# Patient Record
Sex: FEMALE | Race: WHITE | NOT HISPANIC OR LATINO | Employment: OTHER | URBAN - METROPOLITAN AREA
[De-identification: names, ages, dates, MRNs, and addresses within clinical notes are randomized per-mention and may not be internally consistent; named-entity substitution may affect disease eponyms.]

---

## 2021-08-24 ENCOUNTER — HISTORICAL (OUTPATIENT)
Dept: RADIOLOGY | Facility: HOSPITAL | Age: 86
End: 2021-08-24

## 2021-09-20 ENCOUNTER — HISTORICAL (OUTPATIENT)
Dept: RESPIRATORY THERAPY | Facility: HOSPITAL | Age: 86
End: 2021-09-20

## 2021-11-19 ENCOUNTER — HOSPITAL ENCOUNTER (OUTPATIENT)
Dept: MEDSURG UNIT | Facility: HOSPITAL | Age: 86
End: 2021-11-21
Attending: INTERNAL MEDICINE | Admitting: INTERNAL MEDICINE

## 2021-11-19 LAB
FLUAV AG UPPER RESP QL IA.RAPID: NEGATIVE
FLUBV AG UPPER RESP QL IA.RAPID: NEGATIVE
SARS-COV-2 RNA RESP QL NAA+PROBE: NEGATIVE

## 2021-11-20 LAB
ABS NEUT (OLG): 6.6 X10(3)/MCL (ref 1.5–6.9)
ALBUMIN SERPL-MCNC: 3.6 GM/DL (ref 3.4–4.8)
ALBUMIN/GLOB SERPL: 1 RATIO (ref 1.1–2)
ALP SERPL-CCNC: 63 UNIT/L (ref 40–150)
ALT SERPL-CCNC: 16 UNIT/L (ref 0–55)
APPEARANCE, UA: ABNORMAL
APTT PPP: 25 SEC (ref 23.4–34.9)
AST SERPL-CCNC: 22 UNIT/L (ref 5–34)
BACTERIA SPEC CULT: ABNORMAL /HPF
BASOPHILS # BLD AUTO: 0 X10(3)/MCL (ref 0–0.1)
BASOPHILS NFR BLD AUTO: 1 % (ref 0–1)
BILIRUB SERPL-MCNC: 0.7 MG/DL
BILIRUB UR QL STRIP: NEGATIVE
BILIRUBIN DIRECT+TOT PNL SERPL-MCNC: 0.3 MG/DL (ref 0–0.5)
BILIRUBIN DIRECT+TOT PNL SERPL-MCNC: 0.4 MG/DL (ref 0–0.8)
BNP BLD-MCNC: 82 PG/ML
BUN SERPL-MCNC: 11 MG/DL (ref 9.8–20.1)
CALCIUM SERPL-MCNC: 9.8 MG/DL (ref 8.7–10.5)
CHLORIDE SERPL-SCNC: 102 MMOL/L (ref 98–107)
CK SERPL-CCNC: 105 U/L (ref 29–168)
CO2 SERPL-SCNC: 24 MMOL/L (ref 23–31)
COLOR UR: YELLOW
CREAT SERPL-MCNC: 0.72 MG/DL (ref 0.55–1.02)
EOSINOPHIL # BLD AUTO: 0.1 X10(3)/MCL (ref 0–0.6)
EOSINOPHIL NFR BLD AUTO: 1 % (ref 0–5)
ERYTHROCYTE [DISTWIDTH] IN BLOOD BY AUTOMATED COUNT: 12.1 % (ref 11.5–17)
GLOBULIN SER-MCNC: 3.5 GM/DL (ref 2.4–3.5)
GLUCOSE (UA): NEGATIVE MG/DL
GLUCOSE SERPL-MCNC: 116 MG/DL (ref 82–115)
HCT VFR BLD AUTO: 34.7 % (ref 36–48)
HGB BLD-MCNC: 11.7 GM/DL (ref 12–16)
HGB UR QL STRIP: ABNORMAL
IMM GRANULOCYTES # BLD AUTO: 0.04 10*3/UL (ref 0–0.02)
IMM GRANULOCYTES NFR BLD AUTO: 0.4 % (ref 0–0.43)
INR PPP: 1 (ref 2–3)
KETONES UR QL STRIP: NEGATIVE MG/DL
LACTATE SERPL-SCNC: 0.9 MMOL/L (ref 0.5–2.2)
LEUKOCYTE ESTERASE UR QL STRIP: ABNORMAL
LYMPHOCYTES # BLD AUTO: 1.4 X10(3)/MCL (ref 0.5–4.1)
LYMPHOCYTES NFR BLD AUTO: 16 % (ref 15–40)
MCH RBC QN AUTO: 31 PG (ref 27–34)
MCHC RBC AUTO-ENTMCNC: 34 GM/DL (ref 31–36)
MCV RBC AUTO: 91 FL (ref 80–99)
MONOCYTES # BLD AUTO: 0.8 X10(3)/MCL (ref 0–1.1)
MONOCYTES NFR BLD AUTO: 9 % (ref 4–12)
NEUTROPHILS # BLD AUTO: 6.6 X10(3)/MCL (ref 1.5–6.9)
NEUTROPHILS NFR BLD AUTO: 73 % (ref 43–75)
NITRITE UR QL STRIP: POSITIVE
PH UR STRIP: 6 [PH] (ref 4.6–8)
PLATELET # BLD AUTO: 213 X10(3)/MCL (ref 140–400)
PMV BLD AUTO: 9.7 FL (ref 6.8–10)
POTASSIUM SERPL-SCNC: 4.1 MMOL/L (ref 3.5–5.1)
PROT SERPL-MCNC: 7.1 GM/DL (ref 5.8–7.6)
PROT UR QL STRIP: NEGATIVE MG/DL
PROTHROMBIN TIME: 13 SEC (ref 11.7–14.5)
RBC # BLD AUTO: 3.81 X10(6)/MCL (ref 4.2–5.4)
RBC #/AREA URNS HPF: ABNORMAL /HPF
SODIUM SERPL-SCNC: 134 MMOL/L (ref 136–145)
SP GR UR STRIP: 1.01 (ref 1–1.03)
SQUAMOUS EPITHELIAL, UA: ABNORMAL /LPF
TROPONIN I SERPL-MCNC: 0.02 NG/ML (ref 0–0.04)
UROBILINOGEN UR STRIP-ACNC: 0.2 EU/DL
WBC # SPEC AUTO: 9 X10(3)/MCL (ref 4.5–11.5)
WBC #/AREA URNS HPF: ABNORMAL /HPF

## 2021-11-21 LAB
ABS NEUT (OLG): 8.9 X10(3)/MCL (ref 1.5–6.9)
BASOPHILS # BLD AUTO: 0 X10(3)/MCL (ref 0–0.1)
BASOPHILS NFR BLD AUTO: 0 % (ref 0–1)
BUN SERPL-MCNC: 13 MG/DL (ref 9.8–20.1)
CALCIUM SERPL-MCNC: 9.5 MG/DL (ref 8.7–10.5)
CHLORIDE SERPL-SCNC: 101 MMOL/L (ref 98–107)
CO2 SERPL-SCNC: 24 MMOL/L (ref 23–31)
CREAT SERPL-MCNC: 0.74 MG/DL (ref 0.55–1.02)
CREAT/UREA NIT SERPL: 18
EOSINOPHIL # BLD AUTO: 0 X10(3)/MCL (ref 0–0.6)
EOSINOPHIL NFR BLD AUTO: 0 % (ref 0–5)
ERYTHROCYTE [DISTWIDTH] IN BLOOD BY AUTOMATED COUNT: 12.3 % (ref 11.5–17)
GLUCOSE SERPL-MCNC: 100 MG/DL (ref 82–115)
HCT VFR BLD AUTO: 34 % (ref 36–48)
HGB BLD-MCNC: 11.4 GM/DL (ref 12–16)
IMM GRANULOCYTES # BLD AUTO: 0.06 10*3/UL (ref 0–0.02)
IMM GRANULOCYTES NFR BLD AUTO: 0.5 % (ref 0–0.43)
LYMPHOCYTES # BLD AUTO: 1.6 X10(3)/MCL (ref 0.5–4.1)
LYMPHOCYTES NFR BLD AUTO: 14 % (ref 15–40)
MAGNESIUM SERPL-MCNC: 2.1 MG/DL (ref 1.6–2.6)
MCH RBC QN AUTO: 31 PG (ref 27–34)
MCHC RBC AUTO-ENTMCNC: 34 GM/DL (ref 31–36)
MCV RBC AUTO: 92 FL (ref 80–99)
MONOCYTES # BLD AUTO: 1 X10(3)/MCL (ref 0–1.1)
MONOCYTES NFR BLD AUTO: 8 % (ref 4–12)
NEUTROPHILS # BLD AUTO: 8.9 X10(3)/MCL (ref 1.5–6.9)
NEUTROPHILS NFR BLD AUTO: 77 % (ref 43–75)
PLATELET # BLD AUTO: 211 X10(3)/MCL (ref 140–400)
PMV BLD AUTO: 9.5 FL (ref 6.8–10)
POTASSIUM SERPL-SCNC: 4.2 MMOL/L (ref 3.5–5.1)
RBC # BLD AUTO: 3.7 X10(6)/MCL (ref 4.2–5.4)
SODIUM SERPL-SCNC: 136 MMOL/L (ref 136–145)
WBC # SPEC AUTO: 11.6 X10(3)/MCL (ref 4.5–11.5)

## 2022-04-10 ENCOUNTER — HISTORICAL (OUTPATIENT)
Dept: ADMINISTRATIVE | Facility: HOSPITAL | Age: 87
End: 2022-04-10

## 2022-04-24 VITALS
HEIGHT: 61 IN | OXYGEN SATURATION: 98 % | BODY MASS INDEX: 21.9 KG/M2 | WEIGHT: 116 LBS | DIASTOLIC BLOOD PRESSURE: 68 MMHG | SYSTOLIC BLOOD PRESSURE: 124 MMHG

## 2022-05-04 NOTE — HISTORICAL [facility]
This is a historical note converted from [employer]omer. Formatting and pictures may have been removed.  Please reference [employer]omer for original formatting and attached multimedia. 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  Immunizations  No immunizations recorded for this visit.  Hospital Course  85yo female admitted for hypoxia from a COPD/asthma exacerbation.? She was also found to have a UTI as well.? She was started on steroids and nebs.? Today she is doing better and has been weaned off supplemental O2.? She is now stable for discharge home today.  Time Spent on discharge  D/C=34mins  Objective  Vitals & Measurements  T:?36.9? ?C (Oral)? TMIN:?36.6? ?C (Oral)? TMAX:?37.4? ?C (Oral)? HR:?90(Peripheral)? RR:?18? BP:?165/78? SpO2:?92%?  Physical Exam  General: ?Alert and oriented, No acute distress. ?  ??????? ? Appearance: frail. ?  ??????? ? Behavior: Appropriate. ?  ??????? ? Skin: Normal for ethnicity. ?  ?????Eye: ?Pupils are equal, round and reactive to light, Extraocular movements are intact. ?  ?????HENT: ?Normocephalic, Normal hearing, Oral mucosa is moist, No pharyngeal erythema. ?  ?????Neck: ?Supple, Non-tender, No carotid bruit, No jugular venous distention, No lymphadenopathy, No thyromegaly. ?  ?????Respiratory: ?Lungs are clear to auscultation, Breath sounds are equal, No chest wall tenderness. ?  ?????Cardiovascular: ?Normal rate, Regular rhythm, No murmur, No gallop, Good pulses equal in all extremities, Normal peripheral perfusion, No edema. ?  ?????Gastrointestinal: ?Soft, Non-tender, Non-distended, Normal bowel sounds, No  organomegaly. ?  ?????Genitourinary: ?No costovertebral angle tenderness, No urethral discharge. ?  ?????Lymphatics: ?No lymphadenopathy neck, axilla, groin. ?  ?????Musculoskeletal: ?Normal range of motion, Normal strength, No tenderness, No swelling, Normal gait. ?  ?????Integumentary: ?Warm, Dry, Pink, Intact, No rash. ?  ?????Neurologic: ?Alert, Oriented, Normal sensory, Normal motor function, No focal deficits, Cranial Nerves II-XII are grossly intact. ?  ?????Psychiatric: ?Cooperative, Appropriate mood & affect, Normal judgment. ?  Patient Discharge Condition  stable  Discharge Disposition  home   Discharge Medication Reconciliation  Prescribed  albuterol (Ventolin HFA 90 mcg/inh inhalation aerosol)?1 puff(s), INH, q4hr, PRN as needed for wheezing  doxycycline (doxycycline hyclate 100 mg oral capsule)?100 mg, Oral, BID  predniSONE (prednisONE 20 mg oral tablet)?20 mg, Oral, Daily  Continue  albuterol (albuterol 0.083% inhalation solution)?2.5 mg, INH, q6hr, PRN for wheezing  albuterol-ipratropium (DuoNeb 0.5 mg-2.5 mg/3 mL inhalation solution)?3 mL, NEB, QID  benzonatate (Tessalon Perles 100 mg oral capsule)?200 mg, Oral, TID, PRN as needed for cough  bisoprolol (bisoprolol 5 mg oral tablet)?5 mg, Oral, Daily  cetirizine (cetirizine 10 mg oral tablet)?10 mg, Oral, Daily  fluticasone nasal (fluticasone 50 mcg/inh nasal spray)?1 spray(s), Daily  fluticasone-vilanterol (Breo Ellipta 100 mcg-25 mcg/inh inhalation powder)?1 puff(s), INH, Daily  levothyroxine (Synthroid 50 mcg (0.05 mg) oral tablet)?50 mcg, Oral, Daily  losartan (losartan 50 mg oral tablet)?50 mg, Oral, Daily  lovastatin (lovastatin 40 mg oral tablet)?40 mg, Oral, Daily  Education and Orders Provided  Chronic Obstructive Pulmonary Disease, Easy-to-Read  Discharge - 11/21/21 12:24:00 CST, Home?  Discharge Diet - Cardiac?  Discharge Activity - Activity as Tolerated?  Follow up  pcp in 1-2 weeks  Car Se[employer]  No Qualifying Data

## 2022-05-04 NOTE — HISTORICAL [facility]
This is a historical note converted from [employer]omer. Formatting and pictures may have been removed.  Please reference [employer]omer for original formatting and attached multimedia. Chief Complaint  Cough continuously throught the day; denies any other complaints, took flu shot yesterday; has hx of copd; Albuterol Inhaler used 1 hour PTA;  History of Present Illness  86 year old F patient with PMH of COPD, HTN, HLD, GERD and hypothyroidism presents to the ER with SOB, dry cough and wheezing for one day. No exacerbating or relieving factors. Denies fever, chills, chest pain, orthopnea or PND. In the ER, CXR was unremarkable. She felt better after neb treatment but could not be weaned off the 2L of O2 that she was requiring. She was admitted for further management.  Review of Systems  Except as documented, all other systems are negative.?  Physical Exam  Vitals & Measurements  T:?37.7? ?C (Oral)? HR:?77(Monitored)? RR:?20? BP:?137/75? SpO2:?92%? WT:?58?kg?  CONSTITUTIONAL: vitals as noted, alert, awake, no distress  HEENT: No scleral icterus, oropharynx clear  RESPIRATORY: clear to auscultation  CVS: regular rate and rhythm  GASTROINTESTINAL: soft, non-tender, non-distended, bs +  NEURO: grossly normal exam, moves all extremities  HEM/LYMPH: no lymphadenopathy  PSYCH: oriented to time, place and person  SKIN: no rashes noted  Assessment/Plan  ?  COPD exacerbation  - start on duonebs q6 and prednisone  - oxygen protocol  ?  Asymptomatic bacteriuria  - not symptomatic  - hold off on antibiotics  ?  HTN  - home meds  ?  HLD  - statin  ?  Hypothyroidism  - synthroid  ?  Full code and  is surrogate decision maker  ?  The patient is expected to have a LOS < 2 midnights and will be admitted to the observation status  ?  Service was provided using HIPAA compliant web platform using SOC for audio/visual equipment.  Patient Location: Hospital  Provider Location: Home ([place], [place])  Participants on call: bedside RN, patient  Consent  was obtained, and the patient was seen with nurse assisting from the bedside.  ?  ?   Problem List/Past Medical History  Ongoing  COPD with asthma  GERD - Gastro-esophageal reflux disease  Hearing loss  High Cholesterol(  Confirmed  )  Hyperlipidemia  Hypothyroidism  Sinus Trouble(  Confirmed  )  Thyroid Disease(  Confirmed  )  Historical  No qualifying data  Procedure/Surgical History  Cholecystectomy (2002)  Appendectomy (1971)  Hysterectomy (1971)  Tonsillectomy (1954)   Medications  Home  albuterol 0.083% inhalation solution, 2.5 mg= 3 mL, INH, q6hr, PRN, 11 refills  bisoprolol 5 mg oral tablet, 5 mg= 1 tab(s), Oral, Daily  Breo Ellipta 100 mcg-25 mcg/inh inhalation powder, 1 puff(s), INH, Daily, 11 refills  cetirizine 10 mg oral tablet, 10 mg= 1 tab(s), Oral, Daily  DuoNeb 0.5 mg-2.5 mg/3 mL inhalation solution, 3 mL, NEB, QID,? ?Not taking  fluticasone 50 mcg/inh nasal spray, 1 spray(s), Daily  losartan 50 mg oral tablet, 50 mg= 1 tab(s), Oral, Daily  lovastatin 40 mg oral tablet, 40 mg= 1 tab(s), Oral, Daily  Synthroid 50 mcg (0.05 mg) oral tablet, 50 mcg= 1 tab(s), Oral, Daily  Tessalon Perles 100 mg oral capsule, 200 mg= 2 cap(s), Oral, TID, PRN,? ?Not taking  Allergies  No Known Medication Allergies  Social History  Abuse/Neglect  No, 11/19/2021  No, 11/08/2021  No, 10/19/2021  Tobacco  Never (less than 100 in lifetime), N/A, 11/19/2021  Never (less than 100 in lifetime), No, 11/08/2021  Never (less than 100 in lifetime), No, 10/19/2021  Family History  Coronary artery disease: Father.  Immunizations  Vaccine Date Status Comments   COVID-19 mRNA, LNP-S, PF - Moderna 03/02/2021 Recorded Elayne   COVID-19 mRNA, LNP-S, PF - Moderna 02/02/2021 Recorded Elayne   Lab Results  CBC, BMP unremarkable  Diagnostic Results  CXR - no infiltrate

## 2022-06-30 ENCOUNTER — HOSPITAL ENCOUNTER (OUTPATIENT)
Dept: RADIOLOGY | Facility: HOSPITAL | Age: 87
Discharge: HOME | End: 2022-06-30
Payer: MEDICARE

## 2022-06-30 DIAGNOSIS — R05.3 PERSISTENT COUGH FOR 3 WEEKS OR LONGER: ICD-10-CM

## 2022-06-30 DIAGNOSIS — J44.9 CHRONIC OBSTRUCTIVE PULMONARY DISEASE, UNSPECIFIED COPD TYPE: ICD-10-CM

## 2022-06-30 PROCEDURE — 71046 X-RAY EXAM CHEST 2 VIEWS: CPT | Mod: TC

## 2022-08-12 ENCOUNTER — HOSPITAL ENCOUNTER (OUTPATIENT)
Dept: RADIOLOGY | Facility: HOSPITAL | Age: 87
Discharge: HOME | End: 2022-08-12
Attending: INTERNAL MEDICINE
Payer: MEDICARE

## 2022-08-12 DIAGNOSIS — J44.9 COPD (CHRONIC OBSTRUCTIVE PULMONARY DISEASE): ICD-10-CM

## 2022-08-12 PROCEDURE — 71046 X-RAY EXAM CHEST 2 VIEWS: CPT | Mod: TC

## 2022-08-24 DIAGNOSIS — Z78.0 POSTMENOPAUSAL: Primary | ICD-10-CM

## 2022-08-24 DIAGNOSIS — Z12.31 ENCOUNTER FOR SCREENING MAMMOGRAM FOR MALIGNANT NEOPLASM OF BREAST: ICD-10-CM

## 2022-08-30 ENCOUNTER — HOSPITAL ENCOUNTER (OUTPATIENT)
Dept: RADIOLOGY | Facility: HOSPITAL | Age: 87
Discharge: HOME | End: 2022-08-30
Attending: INTERNAL MEDICINE
Payer: MEDICARE

## 2022-08-30 DIAGNOSIS — Z12.31 ENCOUNTER FOR SCREENING MAMMOGRAM FOR MALIGNANT NEOPLASM OF BREAST: ICD-10-CM

## 2022-08-30 DIAGNOSIS — Z78.0 POSTMENOPAUSAL: ICD-10-CM

## 2022-08-30 PROCEDURE — 77080 DXA BONE DENSITY AXIAL: CPT | Mod: TC

## 2022-08-30 PROCEDURE — 77067 SCR MAMMO BI INCL CAD: CPT | Mod: TC

## 2022-08-30 PROCEDURE — 77063 BREAST TOMOSYNTHESIS BI: CPT | Mod: 26,,, | Performed by: STUDENT IN AN ORGANIZED HEALTH CARE EDUCATION/TRAINING PROGRAM

## 2022-08-30 PROCEDURE — 77067 SCR MAMMO BI INCL CAD: CPT | Mod: 26,,, | Performed by: STUDENT IN AN ORGANIZED HEALTH CARE EDUCATION/TRAINING PROGRAM

## 2022-08-30 PROCEDURE — 77067 MAMMO DIGITAL SCREENING BILAT WITH TOMO: ICD-10-PCS | Mod: 26,,, | Performed by: STUDENT IN AN ORGANIZED HEALTH CARE EDUCATION/TRAINING PROGRAM

## 2022-08-30 PROCEDURE — 77063 MAMMO DIGITAL SCREENING BILAT WITH TOMO: ICD-10-PCS | Mod: 26,,, | Performed by: STUDENT IN AN ORGANIZED HEALTH CARE EDUCATION/TRAINING PROGRAM

## 2022-09-19 ENCOUNTER — HOSPITAL ENCOUNTER (EMERGENCY)
Facility: HOSPITAL | Age: 87
Discharge: HOME | End: 2022-09-19
Attending: GENERAL ACUTE CARE HOSPITAL
Payer: MEDICARE

## 2022-09-19 VITALS
BODY MASS INDEX: 23.22 KG/M2 | TEMPERATURE: 98 F | OXYGEN SATURATION: 96 % | RESPIRATION RATE: 18 BRPM | SYSTOLIC BLOOD PRESSURE: 176 MMHG | DIASTOLIC BLOOD PRESSURE: 99 MMHG | HEIGHT: 61 IN | WEIGHT: 123 LBS | HEART RATE: 76 BPM

## 2022-09-19 DIAGNOSIS — R31.9 URINARY TRACT INFECTION WITH HEMATURIA, SITE UNSPECIFIED: ICD-10-CM

## 2022-09-19 DIAGNOSIS — N39.0 URINARY TRACT INFECTION WITH HEMATURIA, SITE UNSPECIFIED: ICD-10-CM

## 2022-09-19 DIAGNOSIS — S39.012A STRAIN OF LUMBAR REGION, INITIAL ENCOUNTER: Primary | ICD-10-CM

## 2022-09-19 LAB
APPEARANCE UR: CLEAR
BACTERIA #/AREA URNS AUTO: ABNORMAL /HPF
BILIRUB UR QL STRIP.AUTO: NEGATIVE MG/DL
COLOR UR AUTO: YELLOW
GLUCOSE UR QL STRIP.AUTO: NEGATIVE MG/DL
KETONES UR QL STRIP.AUTO: NEGATIVE MG/DL
LEUKOCYTE ESTERASE UR QL STRIP.AUTO: ABNORMAL UNIT/L
NITRITE UR QL STRIP.AUTO: POSITIVE
PH UR STRIP.AUTO: 6 [PH]
PROT UR QL STRIP.AUTO: NEGATIVE MG/DL
RBC #/AREA URNS AUTO: ABNORMAL /HPF
RBC UR QL AUTO: ABNORMAL UNIT/L
SP GR UR STRIP.AUTO: 1.01
SQUAMOUS #/AREA URNS AUTO: ABNORMAL /HPF
UROBILINOGEN UR STRIP-ACNC: 0.2 MG/DL
WBC #/AREA URNS AUTO: ABNORMAL /HPF

## 2022-09-19 PROCEDURE — 99284 EMERGENCY DEPT VISIT MOD MDM: CPT | Mod: 25

## 2022-09-19 PROCEDURE — 25000003 PHARM REV CODE 250: Performed by: NURSE PRACTITIONER

## 2022-09-19 PROCEDURE — 81001 URINALYSIS AUTO W/SCOPE: CPT | Performed by: NURSE PRACTITIONER

## 2022-09-19 RX ORDER — DICLOFENAC SODIUM 75 MG/1
75 TABLET, DELAYED RELEASE ORAL 2 TIMES DAILY
COMMUNITY

## 2022-09-19 RX ORDER — METHOCARBAMOL 500 MG/1
500 TABLET, FILM COATED ORAL
Status: COMPLETED | OUTPATIENT
Start: 2022-09-19 | End: 2022-09-19

## 2022-09-19 RX ORDER — ALENDRONATE SODIUM 70 MG/1
70 TABLET ORAL
COMMUNITY
Start: 2022-09-16

## 2022-09-19 RX ORDER — CEPHALEXIN 500 MG/1
500 CAPSULE ORAL 4 TIMES DAILY
Qty: 20 CAPSULE | Refills: 0 | Status: SHIPPED | OUTPATIENT
Start: 2022-09-19 | End: 2022-09-24

## 2022-09-19 RX ORDER — ACETAMINOPHEN 325 MG/1
650 TABLET ORAL EVERY 6 HOURS PRN
COMMUNITY

## 2022-09-19 RX ORDER — GABAPENTIN 100 MG/1
100 CAPSULE ORAL 3 TIMES DAILY
COMMUNITY
Start: 2022-07-29

## 2022-09-19 RX ORDER — CHOLECALCIFEROL (VITAMIN D3) 25 MCG
2000 TABLET ORAL
COMMUNITY

## 2022-09-19 RX ORDER — CEPHALEXIN 250 MG/1
500 CAPSULE ORAL
Status: COMPLETED | OUTPATIENT
Start: 2022-09-19 | End: 2022-09-19

## 2022-09-19 RX ORDER — METHOCARBAMOL 750 MG/1
750 TABLET, FILM COATED ORAL 3 TIMES DAILY
Qty: 15 TABLET | Refills: 0 | Status: SHIPPED | OUTPATIENT
Start: 2022-09-19 | End: 2022-09-24

## 2022-09-19 RX ORDER — FLUTICASONE FUROATE AND VILANTEROL 100; 25 UG/1; UG/1
1 POWDER RESPIRATORY (INHALATION)
COMMUNITY
End: 2023-10-17 | Stop reason: ALTCHOICE

## 2022-09-19 RX ADMIN — CEPHALEXIN 500 MG: 250 CAPSULE ORAL at 07:09

## 2022-09-19 RX ADMIN — METHOCARBAMOL 500 MG: 500 TABLET ORAL at 07:09

## 2022-09-19 NOTE — ED PROVIDER NOTES
"Encounter Date: 9/19/2022       History     Chief Complaint   Patient presents with    Back Pain     PATIENT had T7 fx with repair x1 month ago, also complaining of right shoulder pain with no injury, and also complaining of a "bladder issue" denies dysuria. Daughter states that the patient typically gets UTIs after vaccines.     Patient is 86-year-old female history of Alzheimer's that presents emerged home with her 2 family members with complaints of low back pain and what she thinks to be a bladder infection.  Patient states the symptoms of her ladder started a week ago and that Sunday she was doing some lifting and hurt her back.  The patient is in no distress at this time and is very hard of hearing so her family members at the bedside did have to assist with history.  This is the extent of her complaints in the emergency department today.    Review of patient's allergies indicates:   Allergen Reactions    Ciprofloxacin Itching     Past Medical History:   Diagnosis Date    COPD (chronic obstructive pulmonary disease)     Thyroid disease      Past Surgical History:   Procedure Laterality Date    APPENDECTOMY      CHOLECYSTECTOMY      HYSTERECTOMY      TONSILLECTOMY       No family history on file.  Social History     Tobacco Use    Smoking status: Never    Smokeless tobacco: Never     Review of Systems   Constitutional:  Negative for fever.   HENT:  Negative for sore throat.    Respiratory:  Negative for shortness of breath.    Cardiovascular:  Negative for chest pain.   Gastrointestinal:  Negative for nausea.   Genitourinary:  Positive for dysuria.   Musculoskeletal:  Positive for back pain.   Skin:  Negative for rash.   Neurological:  Negative for dizziness and weakness.   Hematological:  Does not bruise/bleed easily.     Physical Exam     Initial Vitals [09/19/22 1709]   BP Pulse Resp Temp SpO2   (!) 167/81 82 18 98 °F (36.7 °C) 95 %      MAP       --         Physical Exam    Nursing note and vitals " reviewed.  Constitutional: Vital signs are normal. She appears well-developed and well-nourished.  Non-toxic appearance. She does not have a sickly appearance.   HENT:   Head: Normocephalic and atraumatic.   Eyes: Conjunctivae, EOM and lids are normal. Lids are everted and swept, no foreign bodies found.   Neck: Trachea normal and phonation normal. Neck supple. No thyroid mass and no thyromegaly present.   Normal range of motion.   Full passive range of motion without pain.     Cardiovascular:  Normal rate, regular rhythm, S1 normal, S2 normal, normal heart sounds, intact distal pulses and normal pulses.           Pulmonary/Chest: Breath sounds normal.   Musculoskeletal:         General: Tenderness present. Normal range of motion.      Cervical back: Full passive range of motion without pain, normal range of motion and neck supple.      Comments: Left lower back tenderness.     Lymphadenopathy:     She has no cervical adenopathy.   Neurological: She is alert and oriented to person, place, and time. She has normal strength and normal reflexes.   Skin: Skin is warm, dry and intact. Capillary refill takes less than 2 seconds.   Psychiatric: She has a normal mood and affect. Her speech is normal and behavior is normal. Judgment normal. Cognition and memory are normal.       ED Course   Procedures  Labs Reviewed   URINALYSIS, REFLEX TO URINE CULTURE - Abnormal; Notable for the following components:       Result Value    Blood, UA Small (*)     Nitrites, UA Positive (*)     Leukocyte Esterase, UA Trace (*)     All other components within normal limits   URINALYSIS, MICROSCOPIC - Abnormal; Notable for the following components:    Bacteria, UA Many (*)     Squamous Epithelial Cells, UA Few (*)     All other components within normal limits   CBC W/ AUTO DIFFERENTIAL    Narrative:     The following orders were created for panel order CBC auto differential.  Procedure                               Abnormality         Status                      ---------                               -----------         ------                     CBC with Differential[[id number]]                                                         Please view results for these tests on the individual orders.   COMPREHENSIVE METABOLIC PANEL   CBC WITH DIFFERENTIAL          Imaging Results              X-Ray Lumbar Spine 2 Or 3 Views (In process)  Result time 09/19/22 18:48:36   Procedure changed from X-Ray Lumbar Spine Ap And Lateral                    Medications   methocarbamoL tablet 500 mg (has no administration in time range)   cephALEXin capsule 500 mg (has no administration in time range)     Medical Decision Making:   Initial Assessment:   Difficult to acquire accurate history due to patient has Alzheimer's.  Daughters at bedside attempt to provide best assistance with history which included back pain it started a week ago as well as the patient thinking that she had a bladder infection.  For the daughter states that the patient often tries to diagnose herself but they did report she was apparently lifting on her room a Sunday and hurt her back at that time.  The patient has some urinary frequency but does take Macrobid every other day to prevent UTIs.           ED Course as of 09/19/22 1944   Mon Sep 19, 2022   1940 Patient and family refusing labs.  I discussed with him that she does have development UTI and I wanted to rule out any other kidney in involvement or any other signs of infection in the blood work but they do not want this done at this time.  Discussed with them beginning different antibiotic for UTI as well as Robaxin which they agreed with and had no issues.  Will discharge this patient not make them sign an AMA form because they ensured me that if there was any changes or her condition change that they would follow up with the primary care doctor. [SL]      ED Course User Index  [SL] GLENN Grant                 Clinical Impression:    Final diagnoses:  [S39.012A] Strain of lumbar region, initial encounter (Primary)  [N39.0, R31.9] Urinary tract infection with hematuria, site unspecified      ED Disposition Condition    Discharge Stable          ED Prescriptions       Medication Sig Dispense Start Date End Date Auth. Provider    cephALEXin (KEFLEX) 500 MG capsule Take 1 capsule (500 mg total) by mouth 4 (four) times daily. for 5 days 20 capsule 9/19/2022 9/24/2022 GLENN Grant    methocarbamoL (ROBAXIN) 750 MG Tab Take 1 tablet (750 mg total) by mouth 3 (three) times daily. for 5 days 15 tablet 9/19/2022 9/24/2022 GLENN Grant          Follow-up Information       Follow up With Specialties Details Why Contact Info    Reji Duckworth III, MD Internal Medicine Call in 1 week As needed, For ER Follow Up. [address]  \[facility]\"" [address]  [phone]               GLENN Grant  09/19/22 1944

## 2022-09-19 NOTE — ED NOTES
Assumed care of patient at this time. Pt is AAOx3. NAD is noted at time of assessment. Will continue to monitor.

## 2022-09-20 ENCOUNTER — TELEPHONE (OUTPATIENT)
Dept: EMERGENCY MEDICINE | Facility: HOSPITAL | Age: 87
End: 2022-09-20
Payer: MEDICARE

## 2023-04-11 ENCOUNTER — HOSPITAL ENCOUNTER (OUTPATIENT)
Dept: RADIOLOGY | Facility: HOSPITAL | Age: 88
Discharge: HOME | End: 2023-04-11
Attending: INTERNAL MEDICINE
Payer: MEDICARE

## 2023-04-11 DIAGNOSIS — J44.9 CHRONIC OBSTRUCTIVE PULMONARY DISEASE (COPD): ICD-10-CM

## 2023-04-11 PROCEDURE — 71046 X-RAY EXAM CHEST 2 VIEWS: CPT | Mod: TC

## 2023-05-23 DIAGNOSIS — J45.50 SEVERE PERSISTENT ASTHMA, UNSPECIFIED WHETHER COMPLICATED: ICD-10-CM

## 2023-05-30 ENCOUNTER — INFUSION (OUTPATIENT)
Dept: INFUSION THERAPY | Facility: HOSPITAL | Age: 88
End: 2023-05-30
Attending: INTERNAL MEDICINE
Payer: MEDICARE

## 2023-05-30 VITALS
SYSTOLIC BLOOD PRESSURE: 133 MMHG | OXYGEN SATURATION: 92 % | HEART RATE: 68 BPM | BODY MASS INDEX: 23.75 KG/M2 | RESPIRATION RATE: 18 BRPM | TEMPERATURE: 98 F | WEIGHT: 121 LBS | HEIGHT: 60 IN | DIASTOLIC BLOOD PRESSURE: 68 MMHG

## 2023-05-30 DIAGNOSIS — J45.50 SEVERE PERSISTENT ASTHMA, UNSPECIFIED WHETHER COMPLICATED: Primary | ICD-10-CM

## 2023-05-30 PROCEDURE — 96372 THER/PROPH/DIAG INJ SC/IM: CPT

## 2023-05-30 PROCEDURE — 63600175 PHARM REV CODE 636 W HCPCS: Mod: JZ,JG

## 2023-05-30 RX ADMIN — BENRALIZUMAB 30 MG: 30 INJECTION, SOLUTION SUBCUTANEOUS at 08:05

## 2023-06-27 ENCOUNTER — INFUSION (OUTPATIENT)
Dept: INFUSION THERAPY | Facility: HOSPITAL | Age: 88
End: 2023-06-27
Attending: INTERNAL MEDICINE
Payer: MEDICARE

## 2023-06-27 VITALS
DIASTOLIC BLOOD PRESSURE: 72 MMHG | HEIGHT: 60 IN | OXYGEN SATURATION: 90 % | HEART RATE: 77 BPM | TEMPERATURE: 98 F | WEIGHT: 121 LBS | BODY MASS INDEX: 23.75 KG/M2 | RESPIRATION RATE: 20 BRPM | SYSTOLIC BLOOD PRESSURE: 147 MMHG

## 2023-06-27 DIAGNOSIS — J45.50 SEVERE PERSISTENT ASTHMA, UNSPECIFIED WHETHER COMPLICATED: Primary | ICD-10-CM

## 2023-06-27 PROCEDURE — 63600175 PHARM REV CODE 636 W HCPCS: Mod: JZ,JG

## 2023-06-27 PROCEDURE — 96372 THER/PROPH/DIAG INJ SC/IM: CPT

## 2023-06-27 RX ADMIN — BENRALIZUMAB 30 MG: 30 INJECTION, SOLUTION SUBCUTANEOUS at 08:06

## 2023-07-06 PROCEDURE — 87070 CULTURE OTHR SPECIMN AEROBIC: CPT | Performed by: NURSE PRACTITIONER

## 2023-07-25 ENCOUNTER — INFUSION (OUTPATIENT)
Dept: INFUSION THERAPY | Facility: HOSPITAL | Age: 88
End: 2023-07-25
Attending: INTERNAL MEDICINE
Payer: MEDICARE

## 2023-07-25 VITALS
SYSTOLIC BLOOD PRESSURE: 128 MMHG | HEART RATE: 79 BPM | RESPIRATION RATE: 18 BRPM | OXYGEN SATURATION: 95 % | HEIGHT: 60 IN | WEIGHT: 122.63 LBS | BODY MASS INDEX: 24.07 KG/M2 | DIASTOLIC BLOOD PRESSURE: 66 MMHG | TEMPERATURE: 98 F

## 2023-07-25 DIAGNOSIS — J45.50 SEVERE PERSISTENT ASTHMA, UNSPECIFIED WHETHER COMPLICATED: Primary | ICD-10-CM

## 2023-07-25 PROCEDURE — 63600175 PHARM REV CODE 636 W HCPCS: Mod: JZ,JG

## 2023-07-25 PROCEDURE — 96372 THER/PROPH/DIAG INJ SC/IM: CPT

## 2023-07-25 RX ADMIN — BENRALIZUMAB 30 MG: 30 INJECTION, SOLUTION SUBCUTANEOUS at 08:07

## 2023-08-15 PROBLEM — J45.901 EXACERBATION OF ASTHMA: Status: ACTIVE | Noted: 2023-08-15

## 2023-09-12 ENCOUNTER — HOSPITAL ENCOUNTER (OUTPATIENT)
Dept: RADIOLOGY | Facility: HOSPITAL | Age: 88
Discharge: HOME | End: 2023-09-12
Attending: INTERNAL MEDICINE
Payer: MEDICARE

## 2023-09-12 DIAGNOSIS — J06.9 UPPER RESPIRATORY INFECTION: ICD-10-CM

## 2023-09-12 PROCEDURE — 71046 X-RAY EXAM CHEST 2 VIEWS: CPT | Mod: TC

## 2023-09-19 ENCOUNTER — INFUSION (OUTPATIENT)
Dept: INFUSION THERAPY | Facility: HOSPITAL | Age: 88
End: 2023-09-19
Attending: INTERNAL MEDICINE
Payer: MEDICARE

## 2023-09-19 VITALS
RESPIRATION RATE: 22 BRPM | DIASTOLIC BLOOD PRESSURE: 67 MMHG | HEIGHT: 60 IN | WEIGHT: 125 LBS | BODY MASS INDEX: 24.54 KG/M2 | OXYGEN SATURATION: 94 % | HEART RATE: 80 BPM | SYSTOLIC BLOOD PRESSURE: 141 MMHG | TEMPERATURE: 98 F

## 2023-09-19 DIAGNOSIS — J45.50 SEVERE PERSISTENT ASTHMA, UNSPECIFIED WHETHER COMPLICATED: Primary | ICD-10-CM

## 2023-09-19 PROCEDURE — 63600175 PHARM REV CODE 636 W HCPCS: Mod: JZ,JG

## 2023-09-19 PROCEDURE — 96372 THER/PROPH/DIAG INJ SC/IM: CPT

## 2023-09-19 RX ADMIN — BENRALIZUMAB 30 MG: 30 INJECTION, SOLUTION SUBCUTANEOUS at 09:09

## 2023-10-03 ENCOUNTER — HOSPITAL ENCOUNTER (OUTPATIENT)
Dept: RADIOLOGY | Facility: HOSPITAL | Age: 88
Discharge: HOME | End: 2023-10-03
Attending: INTERNAL MEDICINE
Payer: MEDICARE

## 2023-10-03 DIAGNOSIS — R05.2 SUBACUTE COUGH: ICD-10-CM

## 2023-10-03 PROCEDURE — 71046 X-RAY EXAM CHEST 2 VIEWS: CPT | Mod: TC

## 2023-11-03 ENCOUNTER — HOSPITAL ENCOUNTER (OUTPATIENT)
Dept: RADIOLOGY | Facility: HOSPITAL | Age: 88
Discharge: HOME | End: 2023-11-03
Attending: INTERNAL MEDICINE
Payer: MEDICARE

## 2023-11-03 DIAGNOSIS — Z12.31 ENCOUNTER FOR SCREENING MAMMOGRAM FOR BREAST CANCER: ICD-10-CM

## 2023-11-03 PROCEDURE — 77067 SCR MAMMO BI INCL CAD: CPT | Mod: 26,,, | Performed by: STUDENT IN AN ORGANIZED HEALTH CARE EDUCATION/TRAINING PROGRAM

## 2023-11-03 PROCEDURE — 77063 MAMMO DIGITAL SCREENING BILAT WITH TOMO: ICD-10-PCS | Mod: 26,,, | Performed by: STUDENT IN AN ORGANIZED HEALTH CARE EDUCATION/TRAINING PROGRAM

## 2023-11-03 PROCEDURE — 77067 SCR MAMMO BI INCL CAD: CPT | Mod: TC

## 2023-11-03 PROCEDURE — 77063 BREAST TOMOSYNTHESIS BI: CPT | Mod: 26,,, | Performed by: STUDENT IN AN ORGANIZED HEALTH CARE EDUCATION/TRAINING PROGRAM

## 2023-11-03 PROCEDURE — 77067 MAMMO DIGITAL SCREENING BILAT WITH TOMO: ICD-10-PCS | Mod: 26,,, | Performed by: STUDENT IN AN ORGANIZED HEALTH CARE EDUCATION/TRAINING PROGRAM

## 2023-11-14 ENCOUNTER — INFUSION (OUTPATIENT)
Dept: INFUSION THERAPY | Facility: HOSPITAL | Age: 88
End: 2023-11-14
Attending: INTERNAL MEDICINE
Payer: MEDICARE

## 2023-11-14 VITALS
HEIGHT: 60 IN | WEIGHT: 127.19 LBS | OXYGEN SATURATION: 95 % | DIASTOLIC BLOOD PRESSURE: 71 MMHG | SYSTOLIC BLOOD PRESSURE: 157 MMHG | BODY MASS INDEX: 24.97 KG/M2 | RESPIRATION RATE: 18 BRPM | HEART RATE: 72 BPM | TEMPERATURE: 98 F

## 2023-11-14 DIAGNOSIS — J45.50 SEVERE PERSISTENT ASTHMA, UNSPECIFIED WHETHER COMPLICATED: Primary | ICD-10-CM

## 2023-11-14 PROCEDURE — 96372 THER/PROPH/DIAG INJ SC/IM: CPT

## 2023-11-14 PROCEDURE — 63600175 PHARM REV CODE 636 W HCPCS: Mod: JZ,JG

## 2023-11-14 RX ADMIN — BENRALIZUMAB 30 MG: 30 INJECTION, SOLUTION SUBCUTANEOUS at 09:11

## 2024-01-09 ENCOUNTER — INFUSION (OUTPATIENT)
Dept: INFUSION THERAPY | Facility: HOSPITAL | Age: 89
End: 2024-01-09
Attending: INTERNAL MEDICINE
Payer: MEDICARE

## 2024-01-09 VITALS
HEIGHT: 60 IN | OXYGEN SATURATION: 92 % | DIASTOLIC BLOOD PRESSURE: 77 MMHG | SYSTOLIC BLOOD PRESSURE: 154 MMHG | WEIGHT: 129 LBS | TEMPERATURE: 98 F | HEART RATE: 68 BPM | RESPIRATION RATE: 20 BRPM | BODY MASS INDEX: 25.32 KG/M2

## 2024-01-09 DIAGNOSIS — J45.50 SEVERE PERSISTENT ASTHMA, UNSPECIFIED WHETHER COMPLICATED: Primary | ICD-10-CM

## 2024-01-09 PROCEDURE — 96372 THER/PROPH/DIAG INJ SC/IM: CPT

## 2024-01-09 PROCEDURE — 63600175 PHARM REV CODE 636 W HCPCS: Mod: JZ,JG

## 2024-01-09 RX ADMIN — BENRALIZUMAB 30 MG: 30 INJECTION, SOLUTION SUBCUTANEOUS at 09:01

## 2024-01-09 NOTE — PLAN OF CARE
Pt will not have any falls during outpatient treatment. Pt will be assisted on and off scale to prevent falls.

## 2024-03-05 ENCOUNTER — INFUSION (OUTPATIENT)
Dept: INFUSION THERAPY | Facility: HOSPITAL | Age: 89
End: 2024-03-05
Attending: INTERNAL MEDICINE
Payer: MEDICARE

## 2024-03-05 VITALS
RESPIRATION RATE: 20 BRPM | WEIGHT: 130.63 LBS | HEART RATE: 69 BPM | BODY MASS INDEX: 25.64 KG/M2 | OXYGEN SATURATION: 94 % | DIASTOLIC BLOOD PRESSURE: 71 MMHG | TEMPERATURE: 98 F | SYSTOLIC BLOOD PRESSURE: 134 MMHG | HEIGHT: 60 IN

## 2024-03-05 DIAGNOSIS — J45.50 SEVERE PERSISTENT ASTHMA, UNSPECIFIED WHETHER COMPLICATED: Primary | ICD-10-CM

## 2024-03-05 PROCEDURE — 63600175 PHARM REV CODE 636 W HCPCS: Mod: JZ,JG

## 2024-03-05 PROCEDURE — 96372 THER/PROPH/DIAG INJ SC/IM: CPT

## 2024-03-05 RX ADMIN — BENRALIZUMAB 30 MG: 30 INJECTION, SOLUTION SUBCUTANEOUS at 09:03

## 2024-03-05 NOTE — NURSING
Patient here for Fasenra. Patient escorted per family member. Patient is hard of hearing.  9:21: patient tolerates treatment.

## 2024-04-30 ENCOUNTER — INFUSION (OUTPATIENT)
Dept: INFUSION THERAPY | Facility: HOSPITAL | Age: 89
End: 2024-04-30
Attending: INTERNAL MEDICINE
Payer: MEDICARE

## 2024-04-30 VITALS
HEIGHT: 60 IN | TEMPERATURE: 98 F | OXYGEN SATURATION: 90 % | SYSTOLIC BLOOD PRESSURE: 134 MMHG | RESPIRATION RATE: 20 BRPM | DIASTOLIC BLOOD PRESSURE: 71 MMHG | BODY MASS INDEX: 26.38 KG/M2 | HEART RATE: 66 BPM | WEIGHT: 134.38 LBS

## 2024-04-30 DIAGNOSIS — J45.50 SEVERE PERSISTENT ASTHMA, UNSPECIFIED WHETHER COMPLICATED: Primary | ICD-10-CM

## 2024-04-30 PROCEDURE — 63600175 PHARM REV CODE 636 W HCPCS: Mod: JZ,JG

## 2024-04-30 PROCEDURE — 96372 THER/PROPH/DIAG INJ SC/IM: CPT

## 2024-04-30 RX ADMIN — BENRALIZUMAB 30 MG: 30 INJECTION, SOLUTION SUBCUTANEOUS at 09:04

## 2024-05-14 ENCOUNTER — HOSPITAL ENCOUNTER (OUTPATIENT)
Dept: RADIOLOGY | Facility: HOSPITAL | Age: 89
Discharge: HOME | End: 2024-05-14
Attending: NURSE PRACTITIONER
Payer: MEDICARE

## 2024-05-14 DIAGNOSIS — R05.1 ACUTE COUGH: ICD-10-CM

## 2024-05-14 PROCEDURE — 71250 CT THORAX DX C-: CPT | Mod: TC

## 2024-06-27 ENCOUNTER — INFUSION (OUTPATIENT)
Dept: INFUSION THERAPY | Facility: HOSPITAL | Age: 89
End: 2024-06-27
Attending: INTERNAL MEDICINE
Payer: MEDICARE

## 2024-06-27 VITALS
RESPIRATION RATE: 16 BRPM | HEIGHT: 60 IN | TEMPERATURE: 98 F | BODY MASS INDEX: 25.52 KG/M2 | HEART RATE: 75 BPM | DIASTOLIC BLOOD PRESSURE: 78 MMHG | OXYGEN SATURATION: 91 % | WEIGHT: 130 LBS | SYSTOLIC BLOOD PRESSURE: 123 MMHG

## 2024-06-27 DIAGNOSIS — J45.50 SEVERE PERSISTENT ASTHMA, UNSPECIFIED WHETHER COMPLICATED: Primary | ICD-10-CM

## 2024-06-27 PROCEDURE — 63600175 PHARM REV CODE 636 W HCPCS: Mod: JZ,JG | Performed by: NURSE PRACTITIONER

## 2024-06-27 PROCEDURE — 96372 THER/PROPH/DIAG INJ SC/IM: CPT

## 2024-06-27 RX ADMIN — BENRALIZUMAB 30 MG: 30 INJECTION, SOLUTION SUBCUTANEOUS at 08:06

## 2024-07-13 ENCOUNTER — HOSPITAL ENCOUNTER (EMERGENCY)
Facility: HOSPITAL | Age: 89
Discharge: HOME | End: 2024-07-13
Attending: INTERNAL MEDICINE
Payer: MEDICARE

## 2024-07-13 VITALS
OXYGEN SATURATION: 97 % | RESPIRATION RATE: 29 BRPM | DIASTOLIC BLOOD PRESSURE: 75 MMHG | SYSTOLIC BLOOD PRESSURE: 145 MMHG | HEIGHT: 63 IN | HEART RATE: 80 BPM | TEMPERATURE: 99 F | WEIGHT: 125 LBS | BODY MASS INDEX: 22.15 KG/M2

## 2024-07-13 DIAGNOSIS — R05.9 COUGH: ICD-10-CM

## 2024-07-13 DIAGNOSIS — R05.3 CHRONIC COUGH: Primary | ICD-10-CM

## 2024-07-13 LAB
ALBUMIN SERPL-MCNC: 3.7 G/DL (ref 3.4–4.8)
ALBUMIN/GLOB SERPL: 1.1 RATIO (ref 1.1–2)
ALP SERPL-CCNC: 44 UNIT/L (ref 40–150)
ALT SERPL-CCNC: 12 UNIT/L (ref 0–55)
ANION GAP SERPL CALC-SCNC: 10 MEQ/L
AST SERPL-CCNC: 21 UNIT/L (ref 5–34)
BASOPHILS # BLD AUTO: 0.03 X10(3)/MCL
BASOPHILS NFR BLD AUTO: 0.2 %
BILIRUB SERPL-MCNC: 1.1 MG/DL
BNP BLD-MCNC: 150.3 PG/ML
BUN SERPL-MCNC: 11 MG/DL (ref 9.8–20.1)
CALCIUM SERPL-MCNC: 10.5 MG/DL (ref 8.4–10.2)
CHLORIDE SERPL-SCNC: 103 MMOL/L (ref 98–107)
CO2 SERPL-SCNC: 26 MMOL/L (ref 23–31)
CREAT SERPL-MCNC: 0.79 MG/DL (ref 0.55–1.02)
CREAT/UREA NIT SERPL: 14
EOSINOPHIL # BLD AUTO: 0 X10(3)/MCL (ref 0–0.9)
EOSINOPHIL NFR BLD AUTO: 0 %
ERYTHROCYTE [DISTWIDTH] IN BLOOD BY AUTOMATED COUNT: 12.4 % (ref 11.5–17)
GFR SERPLBLD CREATININE-BSD FMLA CKD-EPI: >60 ML/MIN/1.73/M2
GLOBULIN SER-MCNC: 3.4 GM/DL (ref 2.4–3.5)
GLUCOSE SERPL-MCNC: 101 MG/DL (ref 82–115)
HCT VFR BLD AUTO: 37.4 % (ref 37–47)
HGB BLD-MCNC: 12.5 G/DL (ref 12–16)
IMM GRANULOCYTES # BLD AUTO: 0.07 X10(3)/MCL (ref 0–0.04)
IMM GRANULOCYTES NFR BLD AUTO: 0.5 %
LYMPHOCYTES # BLD AUTO: 3.04 X10(3)/MCL (ref 0.6–4.6)
LYMPHOCYTES NFR BLD AUTO: 22.1 %
MCH RBC QN AUTO: 30.9 PG (ref 27–31)
MCHC RBC AUTO-ENTMCNC: 33.4 G/DL (ref 33–36)
MCV RBC AUTO: 92.3 FL (ref 80–94)
MONOCYTES # BLD AUTO: 1.06 X10(3)/MCL (ref 0.1–1.3)
MONOCYTES NFR BLD AUTO: 7.7 %
NEUTROPHILS # BLD AUTO: 9.53 X10(3)/MCL (ref 2.1–9.2)
NEUTROPHILS NFR BLD AUTO: 69.5 %
PLATELET # BLD AUTO: 223 X10(3)/MCL (ref 130–400)
PMV BLD AUTO: 9.9 FL (ref 7.4–10.4)
POTASSIUM SERPL-SCNC: 3.8 MMOL/L (ref 3.5–5.1)
PROT SERPL-MCNC: 7.1 GM/DL (ref 5.8–7.6)
RBC # BLD AUTO: 4.05 X10(6)/MCL (ref 4.2–5.4)
SODIUM SERPL-SCNC: 139 MMOL/L (ref 136–145)
TROPONIN I SERPL-MCNC: 0.03 NG/ML (ref 0–0.04)
WBC # BLD AUTO: 13.73 X10(3)/MCL (ref 4.5–11.5)

## 2024-07-13 PROCEDURE — 85025 COMPLETE CBC W/AUTO DIFF WBC: CPT | Performed by: INTERNAL MEDICINE

## 2024-07-13 PROCEDURE — 99285 EMERGENCY DEPT VISIT HI MDM: CPT | Mod: 25

## 2024-07-13 PROCEDURE — 93005 ELECTROCARDIOGRAM TRACING: CPT

## 2024-07-13 PROCEDURE — 83880 ASSAY OF NATRIURETIC PEPTIDE: CPT | Performed by: INTERNAL MEDICINE

## 2024-07-13 PROCEDURE — 80053 COMPREHEN METABOLIC PANEL: CPT | Performed by: INTERNAL MEDICINE

## 2024-07-13 PROCEDURE — 93010 ELECTROCARDIOGRAM REPORT: CPT | Mod: ,,, | Performed by: INTERNAL MEDICINE

## 2024-07-13 PROCEDURE — 25000003 PHARM REV CODE 250: Performed by: INTERNAL MEDICINE

## 2024-07-13 PROCEDURE — 84484 ASSAY OF TROPONIN QUANT: CPT | Performed by: INTERNAL MEDICINE

## 2024-07-13 RX ORDER — NAPROXEN SODIUM 220 MG/1
324 TABLET, FILM COATED ORAL
Status: COMPLETED | OUTPATIENT
Start: 2024-07-13 | End: 2024-07-13

## 2024-07-13 RX ADMIN — ASPIRIN 81 MG CHEWABLE TABLET 324 MG: 81 TABLET CHEWABLE at 11:07

## 2024-07-13 NOTE — DISCHARGE INSTRUCTIONS
Sometime cough finger episodes can be just from acid reflux also, you can try Mylanta which comes over-the-counter to see if that helps when she is having these episodes.    You can also see an ENT doctor for evaluation        Take medicines as prescribed    See your family doctor in one to 2 days for further evaluation, workup, and treatment as necessary    Avoid driving or operating machinery while taking medicines as some medicines might cause drowsiness and may cause problems. Also pain medicines have potential of being addictive  so use Pain meds specially Narcotics Sparingly.    The exam and treatment you received in Emergency Room was for an urgent problem and NOT INTENDED AS COMPLETE CARE. It is important that you FOLLOW UP with a doctor for ongoing care. If your symptoms become WORSE or you DO NOT IMPROVE and you are unable to reach your health care provider, you should RETURN to the emergency department. The Emergency Room doctor has provided a PRELIMINARY INTERPRETATION of all your STUDIES. A final interpretation may be done after you are discharged. IF A CHANGE in your diagnosis or treatment is needed WE WILL CONTACT YOU. It is critical that we have a CURRENT PHONE NUMBER FOR YOU.

## 2024-07-13 NOTE — ED PROVIDER NOTES
Encounter Date: 7/13/2024  History from patient and daughter     History     Chief Complaint   Patient presents with    Shortness of Breath     SOB that started last night. Hx of COPD     HPI    Renata Escalona is 88 y.o. female who  has a past medical history of COPD (chronic obstructive pulmonary disease) and Thyroid disease. arrives in ER with c/o Shortness of Breath (SOB that started last night. Hx of COPD)      Review of patient's allergies indicates:   Allergen Reactions    Ciprofloxacin Itching     Past Medical History:   Diagnosis Date    COPD (chronic obstructive pulmonary disease)     Thyroid disease      Past Surgical History:   Procedure Laterality Date    APPENDECTOMY      CHOLECYSTECTOMY      HYSTERECTOMY      SPINE SURGERY      TONSILLECTOMY       Family History   Problem Relation Name Age of Onset    Brain cancer Mother      Heart disease Father       Social History     Tobacco Use    Smoking status: Never    Smokeless tobacco: Never   Substance Use Topics    Alcohol use: Not Currently    Drug use: Never     Review of Systems   Constitutional:  Negative for fever.   HENT:  Negative for trouble swallowing and voice change.    Eyes:  Negative for visual disturbance.   Respiratory:  Positive for cough. Negative for shortness of breath.    Cardiovascular:  Negative for chest pain.   Gastrointestinal:  Negative for abdominal pain, diarrhea and vomiting.   Genitourinary:  Negative for dysuria and hematuria.   Musculoskeletal:  Negative for back pain and gait problem.   Skin:  Negative for color change and rash.   Neurological:  Negative for headaches.   Psychiatric/Behavioral:  Negative for behavioral problems and sleep disturbance.    All other systems reviewed and are negative.      Physical Exam     Initial Vitals [07/13/24 1046]   BP Pulse Resp Temp SpO2   (!) 168/79 94 (!) 26 99 °F (37.2 °C) (!) 91 %      MAP       --         Physical Exam    Nursing note and vitals reviewed.  Constitutional: She  appears well-developed. No distress.   HENT:   Head: Atraumatic.   Neck: Neck supple.   Cardiovascular:  Normal rate and regular rhythm.           Pulmonary/Chest: Breath sounds normal. No respiratory distress. She has no wheezes. She has no rhonchi. She has no rales. She exhibits no tenderness.   Abdominal: Abdomen is soft. Bowel sounds are normal. She exhibits no distension.   Musculoskeletal:         General: No edema. Normal range of motion.      Cervical back: Neck supple.     Neurological: She is alert and oriented to person, place, and time.   Skin: Skin is warm and dry.   Psychiatric: She has a normal mood and affect.         ED Course   Procedures  Orders Placed This Encounter   Procedures    X-ray Chest AP Portable    Comprehensive metabolic panel    CBC auto differential    Troponin I    Brain natriuretic peptide    CBC with Differential    Diet NPO    Vital signs    Cardiac Monitoring - Adult    Oxygen Continuous    Pulse Oximetry Continuous    EKG 12-LEAD    Insert saline lock     Medications   aspirin chewable tablet 324 mg (324 mg Oral Given 7/13/24 1146)     Admission on 07/13/2024   Component Date Value Ref Range Status    Sodium 07/13/2024 139  136 - 145 mmol/L Final    Potassium 07/13/2024 3.8  3.5 - 5.1 mmol/L Final    Chloride 07/13/2024 103  98 - 107 mmol/L Final    CO2 07/13/2024 26  23 - 31 mmol/L Final    Glucose 07/13/2024 101  82 - 115 mg/dL Final    Blood Urea Nitrogen 07/13/2024 11.0  9.8 - 20.1 mg/dL Final    Creatinine 07/13/2024 0.79  0.55 - 1.02 mg/dL Final    Calcium 07/13/2024 10.5 (H)  8.4 - 10.2 mg/dL Final    Protein Total 07/13/2024 7.1  5.8 - 7.6 gm/dL Final    Albumin 07/13/2024 3.7  3.4 - 4.8 g/dL Final    Globulin 07/13/2024 3.4  2.4 - 3.5 gm/dL Final    Albumin/Globulin Ratio 07/13/2024 1.1  1.1 - 2.0 ratio Final    Bilirubin Total 07/13/2024 1.1  <=1.5 mg/dL Final    ALP 07/13/2024 44  40 - 150 unit/L Final    ALT 07/13/2024 12  0 - 55 unit/L Final    AST 07/13/2024 21  5  - 34 unit/L Final    eGFR 07/13/2024 >60  mL/min/1.73/m2 Final    Anion Gap 07/13/2024 10.0  mEq/L Final    BUN/Creatinine Ratio 07/13/2024 14   Final    Troponin-I 07/13/2024 0.027  0.000 - 0.045 ng/mL Final    Natriuretic Peptide 07/13/2024 150.3 (H)  <=100.0 pg/mL Final    WBC 07/13/2024 13.73 (H)  4.50 - 11.50 x10(3)/mcL Final    RBC 07/13/2024 4.05 (L)  4.20 - 5.40 x10(6)/mcL Final    Hgb 07/13/2024 12.5  12.0 - 16.0 g/dL Final    Hct 07/13/2024 37.4  37.0 - 47.0 % Final    MCV 07/13/2024 92.3  80.0 - 94.0 fL Final    MCH 07/13/2024 30.9  27.0 - 31.0 pg Final    MCHC 07/13/2024 33.4  33.0 - 36.0 g/dL Final    RDW 07/13/2024 12.4  11.5 - 17.0 % Final    Platelet 07/13/2024 223  130 - 400 x10(3)/mcL Final    MPV 07/13/2024 9.9  7.4 - 10.4 fL Final    Neut % 07/13/2024 69.5  % Final    Lymph % 07/13/2024 22.1  % Final    Mono % 07/13/2024 7.7  % Final    Eos % 07/13/2024 0.0  % Final    Basophil % 07/13/2024 0.2  % Final    Lymph # 07/13/2024 3.04  0.6 - 4.6 x10(3)/mcL Final    Neut # 07/13/2024 9.53 (H)  2.1 - 9.2 x10(3)/mcL Final    Mono # 07/13/2024 1.06  0.1 - 1.3 x10(3)/mcL Final    Eos # 07/13/2024 0.00  0 - 0.9 x10(3)/mcL Final    Baso # 07/13/2024 0.03  <=0.2 x10(3)/mcL Final    IG# 07/13/2024 0.07 (H)  0 - 0.04 x10(3)/mcL Final    IG% 07/13/2024 0.5  % Final       Labs Reviewed   COMPREHENSIVE METABOLIC PANEL - Abnormal; Notable for the following components:       Result Value    Calcium 10.5 (*)     All other components within normal limits   B-TYPE NATRIURETIC PEPTIDE - Abnormal; Notable for the following components:    Natriuretic Peptide 150.3 (*)     All other components within normal limits   CBC WITH DIFFERENTIAL - Abnormal; Notable for the following components:    WBC 13.73 (*)     RBC 4.05 (*)     Neut # 9.53 (*)     IG# 0.07 (*)     All other components within normal limits   TROPONIN I - Normal   CBC W/ AUTO DIFFERENTIAL    Narrative:     The following orders were created for panel order CBC  auto differential.  Procedure                               Abnormality         Status                     ---------                               -----------         ------                     CBC with Differential[[id number]]       Abnormal            Final result                 Please view results for these tests on the individual orders.        ECG Results              EKG 12-LEAD (Preliminary result)  Result time 07/13/24 12:23:51      Wet Read by Arley Suárez MD (07/13/24 12:23:51, [facility] - Emergency Dept, Emergency Medicine)    EKG Initial Reading: Independently Interpreted by Arley Suárez MD. independently as: No STEMI. Rhythm: Normal Sinus Rhythm, Rate 83. Ectopy: No Ectopy. Conduction: Normal. ST Segments: Normal ST Segments. T Waves: Normal. Axis: Normal.                                      Imaging Results              X-ray Chest AP Portable (Final result)  Result time 07/13/24 11:55:00      Final result by Rommel Rosado MD (07/13/24 11:55:00)                   Narrative:    EXAMINATION  XR CHEST AP PORTABLE    CLINICAL HISTORY  Episodic cough;    TECHNIQUE  A total of 1 frontal image(s) of the chest.    COMPARISON  3 October 2023    FINDINGS  Lines/tubes/devices: ECG leads overlie the imaged region.    The cardiomediastinal silhouette and central pulmonary vasculature are unremarkable for utilized technique.  Similar calcification of the aortic arch.  The trachea is midline. There is no lobar consolidation, significant pleural effusion, or convincing pneumothorax.    There is no acute osseous or extrathoracic abnormality.  Chronic degenerative osseous changes and multilevel thoracic vertebroplasty similar to the prior study.    IMPRESSION  1. No acute radiographic process.  2. Chronic secondary details discussed above.      Electronically signed by: Rommel Rosado  Date:    07/13/2024  Time:    11:55                                     Medications   aspirin chewable tablet  324 mg (324 mg Oral Given 7/13/24 1146)     Medical Decision Making    Renata Escalona is 88 y.o. female who  has a past medical history of COPD (chronic obstructive pulmonary disease) and Thyroid disease. arrives in ER with c/o Shortness of Breath (SOB that started last night. Hx of COPD)    Patient's daughter brings her to the emergency room because she has been having coughing spells for the last 5 years, she sees the lung specialist, and they have just put her on some shots to get it every 6 weeks and tell her to take the neb treatment, and it is not working, and she says the heart doctor and he does not do anything, that is keeps telling her that it will get better it will get better, in his not getting better, she had 1 spell of coughing yesterday and then 1 spell of coughing during the night, so today she decided to bring her to the emergency room, because she does not believe that anything is being done by the pulmonologist or the cardiologist and the something wrong with the mother.    I found a cardiac catheterization report from 5/2023 when she had normal coronaries with mild aortic stenosis, she did have a loud murmur on auscultation and I had asked patient's daughter about if she has been told that she has any problem with the heart valve, and she says they are not doing anything about it.    I see that patient has ejection fraction is 75% on cardiac catheterization, I advised her that it looks like that her heart is working pretty good, she has normal coronary arteries and normal function of the heart, so I do not think that she has congestive heart failure, she does not have any edema on the legs, her oxygen saturation is 95% on room air, her heart rate is 77, blood pressure is maintained in 140s,    Her lung sounds are clear at this time.  I advised the daughter that I will do the workup to make sure she does not have any acute abnormality going on but I doubt that she has a pneumonia at this time  because her O2 sat is 95% on room air, and her lung sounds are clear.    Patient's daughter wants just something done about this, and I advised her that I will do the workup and let her know if she has a knee acute abnormality which needs to be addressed in the emergency room or by admission in the hospital.  But since she has been having these episodes for the last 5 years and she has normal lung sounds with normal oxygen level at this time I doubt that she is in heart failure or having any acute asthma attack.  And she should look for things which precipitate her episodes, and she says that there is nothing in the home which can cause her to have bronchospasm.  Including no dogs, no cats, no carpets, no perfumes or smells,    I reassured her that let me checked for any acute abnormality, but definitely I am not specialists in this thing and she will definitely benefit from further evaluation by pulmonologist and cardiologist and even possibly ENT to see she has problem with the acid reflux which may be the cause of her chronic recurrent coughing spells.        Amount and/or Complexity of Data Reviewed  Labs: ordered.  Radiology: ordered.    Risk  OTC drugs.               ED Course as of 07/13/24 1227   Sat Jul 13, 2024   1222 Patient's workup in the emergency room does not reveal any acute abnormality which needs to be addressed, her O2 sat is 97% on room air, her BNP is only 150, she does not have any peripheral edema, her lung sounds are clear, her chemistry and CBC does not show any major abnormality, her EKG is perfectly normal, her troponin is 0.027.    I will reassure them and advise him that they should follow up with the family doctor, and unfortunately since she has been having these episodes for the last 5 years unfortunately she does not have any acute abnormality on the workup today I will let her go home. [GQ]      ED Course User Index  [GQ] Arley Suárez MD                           Clinical  Impression:  Final diagnoses:  [R05.9] Cough  [R05.3] Chronic cough (Primary)          ED Disposition Condition    Discharge Stable          ED Prescriptions    None       Follow-up Information       Follow up With Specialties Details Why Contact Info    Reji Duckworth III, MD Internal Medicine In 2 days  [address]  [address]  [place] [address]  [phone]               Arley Suárez MD  07/13/24 1224       Arley Suárez MD  07/13/24 6043

## 2024-07-15 ENCOUNTER — HOSPITAL ENCOUNTER (INPATIENT)
Facility: HOSPITAL | Age: 89
LOS: 6 days | Discharge: HOME HEALTH | DRG: 203 | End: 2024-07-22
Attending: INTERNAL MEDICINE | Admitting: FAMILY MEDICINE
Payer: MEDICARE

## 2024-07-15 DIAGNOSIS — R07.9 CHEST PAIN: ICD-10-CM

## 2024-07-15 DIAGNOSIS — J44.1 COPD EXACERBATION: ICD-10-CM

## 2024-07-15 PROBLEM — I10 PRIMARY HYPERTENSION: Status: ACTIVE | Noted: 2024-07-15

## 2024-07-15 PROBLEM — R09.02 HYPOXEMIA: Status: ACTIVE | Noted: 2024-07-15

## 2024-07-15 LAB
ALBUMIN SERPL-MCNC: 3.5 G/DL (ref 3.4–4.8)
ALBUMIN/GLOB SERPL: 0.9 RATIO (ref 1.1–2)
ALLENS TEST: ABNORMAL
ALP SERPL-CCNC: 47 UNIT/L (ref 40–150)
ALT SERPL-CCNC: 15 UNIT/L (ref 0–55)
ANION GAP SERPL CALC-SCNC: 8 MEQ/L
AST SERPL-CCNC: 24 UNIT/L (ref 5–34)
BACTERIA #/AREA URNS AUTO: ABNORMAL /HPF
BASOPHILS # BLD AUTO: 0.03 X10(3)/MCL
BASOPHILS NFR BLD AUTO: 0.2 %
BILIRUB SERPL-MCNC: 1 MG/DL
BILIRUB UR QL STRIP.AUTO: NEGATIVE
BUN SERPL-MCNC: 13 MG/DL (ref 9.8–20.1)
CALCIUM SERPL-MCNC: 9.4 MG/DL (ref 8.4–10.2)
CHLORIDE SERPL-SCNC: 101 MMOL/L (ref 98–107)
CLARITY UR: CLEAR
CO2 SERPL-SCNC: 26 MMOL/L (ref 23–31)
COLOR UR AUTO: YELLOW
CREAT SERPL-MCNC: 0.68 MG/DL (ref 0.55–1.02)
CREAT/UREA NIT SERPL: 19
DELSYS: ABNORMAL
EOSINOPHIL # BLD AUTO: 0 X10(3)/MCL (ref 0–0.9)
EOSINOPHIL NFR BLD AUTO: 0 %
ERYTHROCYTE [DISTWIDTH] IN BLOOD BY AUTOMATED COUNT: 12.3 % (ref 11.5–17)
FLUAV AG UPPER RESP QL IA.RAPID: NOT DETECTED
FLUBV AG UPPER RESP QL IA.RAPID: NOT DETECTED
GFR SERPLBLD CREATININE-BSD FMLA CKD-EPI: >60 ML/MIN/1.73/M2
GLOBULIN SER-MCNC: 3.8 GM/DL (ref 2.4–3.5)
GLUCOSE SERPL-MCNC: 133 MG/DL (ref 82–115)
GLUCOSE UR QL STRIP: NEGATIVE
HCO3 UR-SCNC: 25.8 MMOL/L (ref 24–28)
HCT VFR BLD AUTO: 37.6 % (ref 37–47)
HGB BLD-MCNC: 12.5 G/DL (ref 12–16)
HGB UR QL STRIP: ABNORMAL
IMM GRANULOCYTES # BLD AUTO: 0.11 X10(3)/MCL (ref 0–0.04)
IMM GRANULOCYTES NFR BLD AUTO: 0.8 %
KETONES UR QL STRIP: NEGATIVE
LEUKOCYTE ESTERASE UR QL STRIP: ABNORMAL
LYMPHOCYTES # BLD AUTO: 1.76 X10(3)/MCL (ref 0.6–4.6)
LYMPHOCYTES NFR BLD AUTO: 12.2 %
MCH RBC QN AUTO: 30.9 PG (ref 27–31)
MCHC RBC AUTO-ENTMCNC: 33.2 G/DL (ref 33–36)
MCV RBC AUTO: 92.8 FL (ref 80–94)
MONOCYTES # BLD AUTO: 0.39 X10(3)/MCL (ref 0.1–1.3)
MONOCYTES NFR BLD AUTO: 2.7 %
NEUTROPHILS # BLD AUTO: 12.19 X10(3)/MCL (ref 2.1–9.2)
NEUTROPHILS NFR BLD AUTO: 84.1 %
NITRITE UR QL STRIP: POSITIVE
OHS QRS DURATION: 66 MS
OHS QRS DURATION: 70 MS
OHS QTC CALCULATION: 437 MS
OHS QTC CALCULATION: 453 MS
PCO2 BLDA: 40.4 MMHG (ref 35–45)
PH SMN: 7.41 [PH] (ref 7.35–7.45)
PH UR STRIP: 6 [PH]
PLATELET # BLD AUTO: 208 X10(3)/MCL (ref 130–400)
PMV BLD AUTO: 9.5 FL (ref 7.4–10.4)
PO2 BLDA: 75 MMHG (ref 80–100)
POC BE: 1 MMOL/L
POC SATURATED O2: 95 % (ref 95–100)
POC TCO2: 27 MMOL/L (ref 23–27)
POTASSIUM SERPL-SCNC: 4.1 MMOL/L (ref 3.5–5.1)
PROT SERPL-MCNC: 7.3 GM/DL (ref 5.8–7.6)
PROT UR QL STRIP: NEGATIVE
RBC # BLD AUTO: 4.05 X10(6)/MCL (ref 4.2–5.4)
RBC #/AREA URNS AUTO: ABNORMAL /HPF
RSV A 5' UTR RNA NPH QL NAA+PROBE: NOT DETECTED
SAMPLE: ABNORMAL
SARS-COV-2 RNA RESP QL NAA+PROBE: NOT DETECTED
SITE: ABNORMAL
SODIUM SERPL-SCNC: 135 MMOL/L (ref 136–145)
SP GR UR STRIP.AUTO: 1.01 (ref 1–1.03)
SQUAMOUS #/AREA URNS AUTO: ABNORMAL /HPF
UROBILINOGEN UR STRIP-ACNC: 0.2
WBC # BLD AUTO: 14.48 X10(3)/MCL (ref 4.5–11.5)
WBC #/AREA URNS AUTO: ABNORMAL /HPF

## 2024-07-15 PROCEDURE — 0241U COVID/RSV/FLU A&B PCR: CPT | Performed by: FAMILY MEDICINE

## 2024-07-15 PROCEDURE — 85025 COMPLETE CBC W/AUTO DIFF WBC: CPT | Performed by: FAMILY MEDICINE

## 2024-07-15 PROCEDURE — 25000242 PHARM REV CODE 250 ALT 637 W/ HCPCS: Performed by: FAMILY MEDICINE

## 2024-07-15 PROCEDURE — 94640 AIRWAY INHALATION TREATMENT: CPT | Mod: XB

## 2024-07-15 PROCEDURE — 63600175 PHARM REV CODE 636 W HCPCS: Performed by: INTERNAL MEDICINE

## 2024-07-15 PROCEDURE — 36600 WITHDRAWAL OF ARTERIAL BLOOD: CPT

## 2024-07-15 PROCEDURE — G0379 DIRECT REFER HOSPITAL OBSERV: HCPCS

## 2024-07-15 PROCEDURE — 80053 COMPREHEN METABOLIC PANEL: CPT | Performed by: FAMILY MEDICINE

## 2024-07-15 PROCEDURE — 36415 COLL VENOUS BLD VENIPUNCTURE: CPT | Performed by: FAMILY MEDICINE

## 2024-07-15 PROCEDURE — 93005 ELECTROCARDIOGRAM TRACING: CPT

## 2024-07-15 PROCEDURE — 27000221 HC OXYGEN, UP TO 24 HOURS

## 2024-07-15 PROCEDURE — G0378 HOSPITAL OBSERVATION PER HR: HCPCS

## 2024-07-15 PROCEDURE — 63600175 PHARM REV CODE 636 W HCPCS: Performed by: FAMILY MEDICINE

## 2024-07-15 PROCEDURE — 25000242 PHARM REV CODE 250 ALT 637 W/ HCPCS: Performed by: INTERNAL MEDICINE

## 2024-07-15 PROCEDURE — 93010 ELECTROCARDIOGRAM REPORT: CPT | Mod: ,,, | Performed by: INTERNAL MEDICINE

## 2024-07-15 PROCEDURE — 81003 URINALYSIS AUTO W/O SCOPE: CPT | Performed by: FAMILY MEDICINE

## 2024-07-15 PROCEDURE — 82803 BLOOD GASES ANY COMBINATION: CPT

## 2024-07-15 PROCEDURE — 25000003 PHARM REV CODE 250: Performed by: INTERNAL MEDICINE

## 2024-07-15 PROCEDURE — 99900035 HC TECH TIME PER 15 MIN (STAT)

## 2024-07-15 PROCEDURE — 94761 N-INVAS EAR/PLS OXIMETRY MLT: CPT | Mod: XB

## 2024-07-15 PROCEDURE — 99900031 HC PATIENT EDUCATION (STAT)

## 2024-07-15 PROCEDURE — 81001 URINALYSIS AUTO W/SCOPE: CPT | Performed by: FAMILY MEDICINE

## 2024-07-15 RX ORDER — METHYLPREDNISOLONE SOD SUCC 125 MG
125 VIAL (EA) INJECTION ONCE
Status: COMPLETED | OUTPATIENT
Start: 2024-07-15 | End: 2024-07-15

## 2024-07-15 RX ORDER — CEFTRIAXONE 1 G/1
1 INJECTION, POWDER, FOR SOLUTION INTRAMUSCULAR; INTRAVENOUS
Status: DISCONTINUED | OUTPATIENT
Start: 2024-07-15 | End: 2024-07-15

## 2024-07-15 RX ORDER — DEXAMETHASONE SODIUM PHOSPHATE 4 MG/ML
4 INJECTION, SOLUTION INTRA-ARTICULAR; INTRALESIONAL; INTRAMUSCULAR; INTRAVENOUS; SOFT TISSUE ONCE
Status: COMPLETED | OUTPATIENT
Start: 2024-07-15 | End: 2024-07-15

## 2024-07-15 RX ORDER — ONDANSETRON HYDROCHLORIDE 2 MG/ML
4 INJECTION, SOLUTION INTRAVENOUS EVERY 4 HOURS PRN
Status: DISCONTINUED | OUTPATIENT
Start: 2024-07-15 | End: 2024-07-22 | Stop reason: HOSPADM

## 2024-07-15 RX ORDER — SODIUM CHLORIDE 0.9 % (FLUSH) 0.9 %
10 SYRINGE (ML) INJECTION EVERY 12 HOURS PRN
Status: DISCONTINUED | OUTPATIENT
Start: 2024-07-15 | End: 2024-07-22 | Stop reason: HOSPADM

## 2024-07-15 RX ORDER — IPRATROPIUM BROMIDE AND ALBUTEROL SULFATE 2.5; .5 MG/3ML; MG/3ML
3 SOLUTION RESPIRATORY (INHALATION) EVERY 4 HOURS
Status: DISCONTINUED | OUTPATIENT
Start: 2024-07-15 | End: 2024-07-21

## 2024-07-15 RX ORDER — GLUCAGON 1 MG
1 KIT INJECTION
Status: DISCONTINUED | OUTPATIENT
Start: 2024-07-15 | End: 2024-07-22 | Stop reason: HOSPADM

## 2024-07-15 RX ORDER — ACETAMINOPHEN 325 MG/1
650 TABLET ORAL EVERY 8 HOURS PRN
Status: DISCONTINUED | OUTPATIENT
Start: 2024-07-15 | End: 2024-07-22 | Stop reason: HOSPADM

## 2024-07-15 RX ORDER — NALOXONE HCL 0.4 MG/ML
0.02 VIAL (ML) INJECTION
Status: DISCONTINUED | OUTPATIENT
Start: 2024-07-15 | End: 2024-07-22 | Stop reason: HOSPADM

## 2024-07-15 RX ORDER — METHYLPREDNISOLONE SOD SUCC 125 MG
80 VIAL (EA) INJECTION EVERY 8 HOURS
Status: DISCONTINUED | OUTPATIENT
Start: 2024-07-15 | End: 2024-07-17

## 2024-07-15 RX ORDER — BUDESONIDE 0.5 MG/2ML
0.5 INHALANT ORAL EVERY 12 HOURS
Status: DISCONTINUED | OUTPATIENT
Start: 2024-07-15 | End: 2024-07-22 | Stop reason: HOSPADM

## 2024-07-15 RX ADMIN — IPRATROPIUM BROMIDE AND ALBUTEROL SULFATE 3 ML: .5; 3 SOLUTION RESPIRATORY (INHALATION) at 11:07

## 2024-07-15 RX ADMIN — METHYLPREDNISOLONE SODIUM SUCCINATE 80 MG: 125 INJECTION INTRAMUSCULAR; INTRAVENOUS at 09:07

## 2024-07-15 RX ADMIN — BUDESONIDE 0.5 MG: 0.5 INHALANT RESPIRATORY (INHALATION) at 07:07

## 2024-07-15 RX ADMIN — IPRATROPIUM BROMIDE AND ALBUTEROL SULFATE 3 ML: .5; 3 SOLUTION RESPIRATORY (INHALATION) at 03:07

## 2024-07-15 RX ADMIN — METHYLPREDNISOLONE SODIUM SUCCINATE 125 MG: 125 INJECTION, POWDER, FOR SOLUTION INTRAMUSCULAR; INTRAVENOUS at 10:07

## 2024-07-15 RX ADMIN — IPRATROPIUM BROMIDE AND ALBUTEROL SULFATE 3 ML: .5; 3 SOLUTION RESPIRATORY (INHALATION) at 07:07

## 2024-07-15 RX ADMIN — DEXAMETHASONE SODIUM PHOSPHATE 4 MG: 4 INJECTION, SOLUTION INTRA-ARTICULAR; INTRALESIONAL; INTRAMUSCULAR; INTRAVENOUS; SOFT TISSUE at 11:07

## 2024-07-15 RX ADMIN — METHYLPREDNISOLONE SODIUM SUCCINATE 80 MG: 125 INJECTION INTRAMUSCULAR; INTRAVENOUS at 03:07

## 2024-07-15 RX ADMIN — AZITHROMYCIN MONOHYDRATE 500 MG: 500 INJECTION, POWDER, LYOPHILIZED, FOR SOLUTION INTRAVENOUS at 08:07

## 2024-07-15 NOTE — PLAN OF CARE
Problem: Adult Inpatient Plan of Care  Goal: Plan of Care Review  Outcome: Progressing  Goal: Patient-Specific Goal (Individualized)  Outcome: Progressing  Goal: Absence of Hospital-Acquired Illness or Injury  Outcome: Progressing  Goal: Optimal Comfort and Wellbeing  Outcome: Progressing  Goal: Readiness for Transition of Care  Outcome: Progressing     Problem: Gas Exchange Impaired  Goal: Optimal Gas Exchange  Outcome: Progressing     Problem: Pain Acute  Goal: Optimal Pain Control and Function  Outcome: Progressing

## 2024-07-16 PROBLEM — E03.9 ACQUIRED HYPOTHYROIDISM: Status: ACTIVE | Noted: 2024-07-16

## 2024-07-16 LAB
ALBUMIN SERPL-MCNC: 3.2 G/DL (ref 3.4–4.8)
ALBUMIN/GLOB SERPL: 1 RATIO (ref 1.1–2)
ALP SERPL-CCNC: 40 UNIT/L (ref 40–150)
ALT SERPL-CCNC: 16 UNIT/L (ref 0–55)
ANION GAP SERPL CALC-SCNC: 6 MEQ/L
AST SERPL-CCNC: 21 UNIT/L (ref 5–34)
B PERT.PT PRMT NPH QL NAA+NON-PROBE: NOT DETECTED
BASOPHILS # BLD AUTO: 0.02 X10(3)/MCL
BASOPHILS NFR BLD AUTO: 0.2 %
BILIRUB SERPL-MCNC: 0.7 MG/DL
BUN SERPL-MCNC: 12 MG/DL (ref 9.8–20.1)
C PNEUM DNA NPH QL NAA+NON-PROBE: NOT DETECTED
CALCIUM SERPL-MCNC: 9.1 MG/DL (ref 8.4–10.2)
CHLORIDE SERPL-SCNC: 104 MMOL/L (ref 98–107)
CO2 SERPL-SCNC: 26 MMOL/L (ref 23–31)
CREAT SERPL-MCNC: 0.66 MG/DL (ref 0.55–1.02)
CREAT/UREA NIT SERPL: 18
EOSINOPHIL # BLD AUTO: 0 X10(3)/MCL (ref 0–0.9)
EOSINOPHIL NFR BLD AUTO: 0 %
ERYTHROCYTE [DISTWIDTH] IN BLOOD BY AUTOMATED COUNT: 12.2 % (ref 11.5–17)
GFR SERPLBLD CREATININE-BSD FMLA CKD-EPI: >60 ML/MIN/1.73/M2
GLOBULIN SER-MCNC: 3.3 GM/DL (ref 2.4–3.5)
GLUCOSE SERPL-MCNC: 158 MG/DL (ref 82–115)
HADV DNA NPH QL NAA+NON-PROBE: NOT DETECTED
HCOV 229E RNA NPH QL NAA+NON-PROBE: NOT DETECTED
HCOV HKU1 RNA NPH QL NAA+NON-PROBE: NOT DETECTED
HCOV NL63 RNA NPH QL NAA+NON-PROBE: NOT DETECTED
HCOV OC43 RNA NPH QL NAA+NON-PROBE: NOT DETECTED
HCT VFR BLD AUTO: 33 % (ref 37–47)
HGB BLD-MCNC: 11.3 G/DL (ref 12–16)
HMPV RNA NPH QL NAA+NON-PROBE: NOT DETECTED
HPIV1 RNA NPH QL NAA+NON-PROBE: NOT DETECTED
HPIV2 RNA NPH QL NAA+NON-PROBE: NOT DETECTED
HPIV3 RNA NPH QL NAA+NON-PROBE: NOT DETECTED
HPIV4 RNA NPH QL NAA+NON-PROBE: NOT DETECTED
IMM GRANULOCYTES # BLD AUTO: 0.08 X10(3)/MCL (ref 0–0.04)
IMM GRANULOCYTES NFR BLD AUTO: 0.6 %
LYMPHOCYTES # BLD AUTO: 1.56 X10(3)/MCL (ref 0.6–4.6)
LYMPHOCYTES NFR BLD AUTO: 12.4 %
M PNEUMO DNA NPH QL NAA+NON-PROBE: NOT DETECTED
MCH RBC QN AUTO: 31.1 PG (ref 27–31)
MCHC RBC AUTO-ENTMCNC: 34.2 G/DL (ref 33–36)
MCV RBC AUTO: 90.9 FL (ref 80–94)
MONOCYTES # BLD AUTO: 0.45 X10(3)/MCL (ref 0.1–1.3)
MONOCYTES NFR BLD AUTO: 3.6 %
NEUTROPHILS # BLD AUTO: 10.46 X10(3)/MCL (ref 2.1–9.2)
NEUTROPHILS NFR BLD AUTO: 83.2 %
PLATELET # BLD AUTO: 196 X10(3)/MCL (ref 130–400)
PMV BLD AUTO: 10.1 FL (ref 7.4–10.4)
POTASSIUM SERPL-SCNC: 3.9 MMOL/L (ref 3.5–5.1)
PROT SERPL-MCNC: 6.5 GM/DL (ref 5.8–7.6)
RBC # BLD AUTO: 3.63 X10(6)/MCL (ref 4.2–5.4)
RSV RNA NPH QL NAA+NON-PROBE: NOT DETECTED
RV+EV RNA NPH QL NAA+NON-PROBE: DETECTED
SODIUM SERPL-SCNC: 136 MMOL/L (ref 136–145)
WBC # BLD AUTO: 12.57 X10(3)/MCL (ref 4.5–11.5)

## 2024-07-16 PROCEDURE — 80053 COMPREHEN METABOLIC PANEL: CPT | Performed by: INTERNAL MEDICINE

## 2024-07-16 PROCEDURE — 63600175 PHARM REV CODE 636 W HCPCS: Performed by: INTERNAL MEDICINE

## 2024-07-16 PROCEDURE — 87486 CHLMYD PNEUM DNA AMP PROBE: CPT | Performed by: INTERNAL MEDICINE

## 2024-07-16 PROCEDURE — 27000221 HC OXYGEN, UP TO 24 HOURS

## 2024-07-16 PROCEDURE — 25000003 PHARM REV CODE 250: Performed by: FAMILY MEDICINE

## 2024-07-16 PROCEDURE — 25500020 PHARM REV CODE 255: Performed by: INTERNAL MEDICINE

## 2024-07-16 PROCEDURE — 36415 COLL VENOUS BLD VENIPUNCTURE: CPT | Performed by: INTERNAL MEDICINE

## 2024-07-16 PROCEDURE — 85025 COMPLETE CBC W/AUTO DIFF WBC: CPT | Performed by: INTERNAL MEDICINE

## 2024-07-16 PROCEDURE — 99900035 HC TECH TIME PER 15 MIN (STAT)

## 2024-07-16 PROCEDURE — 87798 DETECT AGENT NOS DNA AMP: CPT | Performed by: INTERNAL MEDICINE

## 2024-07-16 PROCEDURE — 21400001 HC TELEMETRY ROOM

## 2024-07-16 PROCEDURE — 97116 GAIT TRAINING THERAPY: CPT

## 2024-07-16 PROCEDURE — 94761 N-INVAS EAR/PLS OXIMETRY MLT: CPT

## 2024-07-16 PROCEDURE — 25000003 PHARM REV CODE 250: Performed by: INTERNAL MEDICINE

## 2024-07-16 PROCEDURE — 87581 M.PNEUMON DNA AMP PROBE: CPT | Performed by: INTERNAL MEDICINE

## 2024-07-16 PROCEDURE — 25000242 PHARM REV CODE 250 ALT 637 W/ HCPCS: Performed by: INTERNAL MEDICINE

## 2024-07-16 PROCEDURE — 94640 AIRWAY INHALATION TREATMENT: CPT

## 2024-07-16 PROCEDURE — 97162 PT EVAL MOD COMPLEX 30 MIN: CPT

## 2024-07-16 RX ORDER — ISOSORBIDE MONONITRATE 30 MG/1
30 TABLET, EXTENDED RELEASE ORAL DAILY
Status: DISCONTINUED | OUTPATIENT
Start: 2024-07-16 | End: 2024-07-22 | Stop reason: HOSPADM

## 2024-07-16 RX ORDER — ATORVASTATIN CALCIUM 10 MG/1
10 TABLET, FILM COATED ORAL DAILY
Status: DISCONTINUED | OUTPATIENT
Start: 2024-07-17 | End: 2024-07-22 | Stop reason: HOSPADM

## 2024-07-16 RX ORDER — HYDROCORTISONE 25 MG/G
CREAM TOPICAL 2 TIMES DAILY
Status: DISPENSED | OUTPATIENT
Start: 2024-07-16 | End: 2024-07-21

## 2024-07-16 RX ORDER — ENOXAPARIN SODIUM 100 MG/ML
40 INJECTION SUBCUTANEOUS EVERY 24 HOURS
Status: DISCONTINUED | OUTPATIENT
Start: 2024-07-16 | End: 2024-07-22 | Stop reason: HOSPADM

## 2024-07-16 RX ORDER — GUAIFENESIN 600 MG/1
600 TABLET, EXTENDED RELEASE ORAL 2 TIMES DAILY
Status: DISCONTINUED | OUTPATIENT
Start: 2024-07-16 | End: 2024-07-22 | Stop reason: HOSPADM

## 2024-07-16 RX ORDER — BENZONATATE 100 MG/1
100 CAPSULE ORAL EVERY 6 HOURS PRN
Status: DISCONTINUED | OUTPATIENT
Start: 2024-07-16 | End: 2024-07-22 | Stop reason: HOSPADM

## 2024-07-16 RX ORDER — LOSARTAN POTASSIUM 50 MG/1
50 TABLET ORAL DAILY
Status: DISCONTINUED | OUTPATIENT
Start: 2024-07-16 | End: 2024-07-20

## 2024-07-16 RX ORDER — SODIUM CHLORIDE 9 MG/ML
INJECTION, SOLUTION INTRAVENOUS ONCE
Status: COMPLETED | OUTPATIENT
Start: 2024-07-16 | End: 2024-07-16

## 2024-07-16 RX ADMIN — IPRATROPIUM BROMIDE AND ALBUTEROL SULFATE 3 ML: .5; 3 SOLUTION RESPIRATORY (INHALATION) at 03:07

## 2024-07-16 RX ADMIN — ISOSORBIDE MONONITRATE 30 MG: 30 TABLET, EXTENDED RELEASE ORAL at 01:07

## 2024-07-16 RX ADMIN — CEFTRIAXONE SODIUM 1 G: 1 INJECTION, POWDER, FOR SOLUTION INTRAMUSCULAR; INTRAVENOUS at 03:07

## 2024-07-16 RX ADMIN — BENZONATATE 100 MG: 100 CAPSULE ORAL at 01:07

## 2024-07-16 RX ADMIN — IPRATROPIUM BROMIDE AND ALBUTEROL SULFATE 3 ML: .5; 3 SOLUTION RESPIRATORY (INHALATION) at 11:07

## 2024-07-16 RX ADMIN — IOPAMIDOL 75 ML: 755 INJECTION, SOLUTION INTRAVENOUS at 11:07

## 2024-07-16 RX ADMIN — SODIUM CHLORIDE: 9 INJECTION, SOLUTION INTRAVENOUS at 08:07

## 2024-07-16 RX ADMIN — BENZONATATE 100 MG: 100 CAPSULE ORAL at 08:07

## 2024-07-16 RX ADMIN — BUDESONIDE 0.5 MG: 0.5 INHALANT RESPIRATORY (INHALATION) at 07:07

## 2024-07-16 RX ADMIN — ENOXAPARIN SODIUM 40 MG: 40 INJECTION SUBCUTANEOUS at 11:07

## 2024-07-16 RX ADMIN — METHYLPREDNISOLONE SODIUM SUCCINATE 80 MG: 125 INJECTION INTRAMUSCULAR; INTRAVENOUS at 12:07

## 2024-07-16 RX ADMIN — METHYLPREDNISOLONE SODIUM SUCCINATE 80 MG: 125 INJECTION INTRAMUSCULAR; INTRAVENOUS at 09:07

## 2024-07-16 RX ADMIN — IPRATROPIUM BROMIDE AND ALBUTEROL SULFATE 3 ML: .5; 3 SOLUTION RESPIRATORY (INHALATION) at 07:07

## 2024-07-16 RX ADMIN — LOSARTAN POTASSIUM 50 MG: 50 TABLET, FILM COATED ORAL at 01:07

## 2024-07-16 RX ADMIN — METHYLPREDNISOLONE SODIUM SUCCINATE 80 MG: 125 INJECTION INTRAMUSCULAR; INTRAVENOUS at 05:07

## 2024-07-16 RX ADMIN — GUAIFENESIN 600 MG: 600 TABLET, EXTENDED RELEASE ORAL at 11:07

## 2024-07-16 RX ADMIN — AZITHROMYCIN MONOHYDRATE 500 MG: 500 INJECTION, POWDER, LYOPHILIZED, FOR SOLUTION INTRAVENOUS at 11:07

## 2024-07-16 NOTE — H&P
[facility] - Medical Surgical Unit  [facility] Medicine  History & Physical    Patient Name: Renata Escalona  MRN: [id number]  Patient Class: OP- Observation  Admission Date: 7/15/2024  Attending Physician: Reji Duckworth III, *   Primary Care Provider: Reji Duckworth III, MD         Patient information was obtained from patient and ER records.     Subjective:     Principal Problem:Exacerbation of asthma    Chief Complaint: No chief complaint on file.       HPI: 88-year-old white female with a history of developing lung disease primarily in her 80s.  She is a never smoker and has a nebulous diagnosis of COPD.  Patient reports a cough over the last several days that was incessant and she reports to the office this morning saw my partner Dr. Ross who directly admitted her because of hypoxemia and shortness of breaths.  Family states the patient has a cough and she is usually short of breath couple times a day and then it goes away.  Comes and goes.  She does not have oxygen at home.  Right now she was admitted to the hospital under the presumption of the COPD exacerbation.  Initial workup has been negative and we are putting her here for neb treatments and further workup with regards to the etiology for shortness of breaths.    Past Medical History:   Diagnosis Date    COPD (chronic obstructive pulmonary disease)     Hypertension     Thyroid disease        Past Surgical History:   Procedure Laterality Date    APPENDECTOMY      CHOLECYSTECTOMY      HYSTERECTOMY      SPINE SURGERY      TONSILLECTOMY         Review of patient's allergies indicates:   Allergen Reactions    Ciprofloxacin Itching       No current facility-administered medications on file prior to encounter.     Current Outpatient Medications on File Prior to Encounter   Medication Sig    albuterol (PROVENTIL/VENTOLIN HFA) 90 mcg/actuation inhaler Inhale 2 puffs into the lungs every 6 (six) hours as needed for Wheezing. Rescue     albuterol-ipratropium (DUO-NEB) 2.5 mg-0.5 mg/3 mL nebulizer solution Take 3 mLs by nebulization every 6 (six) hours as needed for Wheezing. Rescue    albuterol-ipratropium (DUO-NEB) 2.5 mg-0.5 mg/3 mL nebulizer solution Take 3 mLs by nebulization every 4 (four) hours as needed for Wheezing.    benzonatate (TESSALON) 100 MG capsule Take 200 mg by mouth.    bisoprolol (ZEBETA) 5 MG tablet Take 5 mg by mouth.    budesonide (PULMICORT) 0.5 mg/2 mL nebulizer solution Take 2 mLs (0.5 mg total) by nebulization 2 (two) times a day. Controller    cetirizine (ZYRTEC) 10 MG tablet Take 10 mg by mouth.    dextromethorphan (DELSYM) 30 mg/5 mL liquid Take 60 mg by mouth 2 (two) times daily.    diclofenac (VOLTAREN) 75 MG EC tablet Take 75 mg by mouth 2 (two) times daily.    isosorbide mononitrate (IMDUR) 30 MG 24 hr tablet Take 30 mg by mouth every morning.    losartan (COZAAR) 50 MG tablet Take 50 mg by mouth.    lovastatin (MEVACOR) 40 MG tablet Take 40 mg by mouth.    memantine (NAMENDA) 10 MG Tab Take 5 mg by mouth.    montelukast (SINGULAIR) 10 mg tablet Take 1 tablet (10 mg total) by mouth every evening.    pantoprazole (PROTONIX) 40 MG tablet Take 40 mg by mouth once daily.    vitamin D (VITAMIN D3) 1000 units Tab Take 2,000 Units by mouth.     Family History       Problem Relation (Age of Onset)    Brain cancer Mother    Heart disease Father          Tobacco Use    Smoking status: Never    Smokeless tobacco: Never   Substance and Sexual Activity    Alcohol use: Not Currently    Drug use: Never    Sexual activity: Not Currently     Review of Systems   Constitutional:  Positive for activity change.   Respiratory:  Positive for cough, shortness of breath, wheezing and stridor.    Cardiovascular:  Negative for chest pain, palpitations and leg swelling.   Endocrine: Negative.    Genitourinary: Negative.    Musculoskeletal: Negative.    Skin: Negative.    Neurological: Negative.    Psychiatric/Behavioral: Negative.        Objective:     Vital Signs (Most Recent):  Temp: 97.6 °F (36.4 °C) (07/15/24 2106)  Pulse: 82 (07/15/24 2106)  Resp: (!) 24 (07/15/24 1915)  BP: 136/72 (07/15/24 2106)  SpO2: (!) 94 % (07/15/24 2106) Vital Signs (24h Range):  Temp:  [97.6 °F (36.4 °C)-98.2 °F (36.8 °C)] 97.6 °F (36.4 °C)  Pulse:  [80-86] 82  Resp:  [22-24] 24  SpO2:  [92 %-98 %] 94 %  BP: (136-166)/(62-76) 136/72        There is no height or weight on file to calculate BMI.     Physical Exam    Patient was alert and oriented x3.  She recognize me.  Has severe presbycusis daughter Adela is at the bedside.  Cranial nerves 2-12 are intact no JVD.  She was bronchophony decreased breath sounds bilateral lungs.  She has polyphonic breath sounds.  She is in mild respiratory accessory muscle usage.  Abdomen is soft nontender nondistended no clubbing cyanosis or edema bilateral lower extremities.           Significant Labs: All pertinent labs within the past 24 hours have been reviewed.  Recent Lab Results         07/15/24  1604   07/15/24  1528   07/15/24  1051   07/15/24  1039        RSV Ag by Molecular Method   Not Detected           Influenza A, Molecular   Not Detected           Influenza B, Molecular   Not Detected           Albumin/Globulin Ratio     0.9         Albumin     3.5         ALP     47         ALT     15         Anion Gap     8.0         Appearance, UA Clear             AST     24         Bacteria, UA Moderate             Baso #     0.03         Basophil %     0.2         Bilirubin (UA) Negative             BILIRUBIN TOTAL     1.0         BUN     13.0         BUN/CREAT RATIO     19         Calcium     9.4         Chloride     101         CO2     26         Color, UA Yellow             Creatinine     0.68         eGFR     >60         Eos #     0.00         Eos %     0.0         Globulin, Total     3.8         Glucose     133         Glucose, UA Negative             Hematocrit     37.6         Hemoglobin     12.5         Immature Grans  (Abs)     0.11         Immature Granulocytes     0.8         Ketones, UA Negative             Leukocyte Esterase, UA 1+             Lymph #     1.76         LYMPH %     12.2         MCH     30.9         MCHC     33.2         MCV     92.8         Mono #     0.39         Mono %     2.7         MPV     9.5         Neut #     12.19         Neut %     84.1         NITRITE UA Positive             Blood, UA 2+             QRS Duration       70       [facility] QTC Calculation       453       pH, UA 6.0             Platelet Count     208         Potassium     4.1         PROTEIN TOTAL     7.3         Protein, UA Negative             RBC     4.05         RBC, UA None Seen             RDW     12.3         SARS-CoV2 (COVID-19) Qualitative PCR   Not Detected           Sodium     135         Specific Gravity,UA 1.015             Squam Epithel, UA None Seen             Urobilinogen, UA 0.2             WBC, UA 0-5             WBC     14.48                 Significant Imaging: I have reviewed all pertinent imaging results/findings within the past 24 hours.  Assessment/Plan:     No notes have been filed under this hospital service.  Service: Hospital Medicine    VTE Risk Mitigation (From admission, onward)           Ordered     IP VTE LOW RISK PATIENT  Once         07/15/24 1030     Place sequential compression device  Until discontinued         07/15/24 1030                       On 07/15/2024, patient should be placed in hospital observation services under my care.    Additionally, we will follow up on the patient was urinary culture.  In case if this is UTI she was covered with Rocephin.         Reji Duckworth III, MD  Department of Hospital Medicine  [facility] - Medical Surgical Unit

## 2024-07-16 NOTE — HPI
88-year-old white female with a history of developing lung disease primarily in her 80s.  She is a never smoker and has a nebulous diagnosis of COPD.  Patient reports a cough over the last several days that was incessant and she reports to the office this morning saw my partner Dr. Ross who directly admitted her because of hypoxemia and shortness of breaths.  Family states the patient has a cough and she is usually short of breath couple times a day and then it goes away.  Comes and goes.  She does not have oxygen at home.  Right now she was admitted to the hospital under the presumption of the COPD exacerbation.  Initial workup has been negative and we are putting her here for neb treatments and further workup with regards to the etiology for shortness of breaths.

## 2024-07-16 NOTE — PLAN OF CARE
Problem: Adult Inpatient Plan of Care  Goal: Plan of Care Review  Outcome: Progressing  Goal: Patient-Specific Goal (Individualized)  Outcome: Progressing  Goal: Absence of Hospital-Acquired Illness or Injury  Outcome: Progressing  Goal: Optimal Comfort and Wellbeing  Outcome: Progressing  Goal: Readiness for Transition of Care  Outcome: Progressing     Problem: Gas Exchange Impaired  Goal: Optimal Gas Exchange  Outcome: Progressing     Problem: Fall Injury Risk  Goal: Absence of Fall and Fall-Related Injury  Outcome: Progressing

## 2024-07-16 NOTE — ASSESSMENT & PLAN NOTE
Will continue antibiotics, plan for PCR in the morning neb treatments I am going to give a dexamethasone nebs once.  May consider BiPAP overnight if the patient was not better or has worsening issues.  Will continue to explore veracity/accuracy of COPD.  She sees Dr. Uriarte in [place] and his nurse practitioner's.  Could she have more interstitial lung disease?

## 2024-07-16 NOTE — PT/OT/SLP EVAL
Physical Therapy Evaluation    Patient Name:  Renata Escalona   MRN:  [id number]    Recommendations:     Discharge Recommendations: Moderate Intensity Therapy (SNF vs home health depending on progress)   Discharge Equipment Recommendations: walker, rolling   Barriers to discharge:  medical condition - SOB with activity    Assessment:     Renata Escalona is a 88 y.o. female admitted with a medical diagnosis of Exacerbation of asthma, with history of COPD per chart, and per daughter she does have dementia.  She presents with the following impairments/functional limitations: weakness, impaired endurance, impaired cognition, impaired self care skills, impaired functional mobility, gait instability, impaired balance, decreased safety awareness .    Physical Therapy evaluation completed. Patient was pleasant and agreeable, daughter was present. Patient presented on supplemental O2 via NC, ambulated ~40ft with RW, on 2L supplemental O2 with WC follow and CGA (patient was instructed to notify this therapist when she became tired or SOB), this therapist noted patient becoming SOB at ~40ft and so returned to sitting Vitals 92-93% with  - patient stated she felt fine and ready to walk out of the hospital but was audibly SOB. Returned to patient's room and transferred to recliner, vitals reassessed, SPO2 in 90's but HR noted to jump between 128-108 sporadically at rest post gait- notified patient's nurse of abnormal pulse, HR steady at 88 at end of session. Spoke to patient's daughter at length regarding the patient's SOB over the past two years, states the SOB seen during the session has been typical of her for about 2 years now and the daughter expressed frustration over not knowing what to do. Patient ended session sitting up in her recliner, all needs in reach, chair alarm on, notified patient and daughter of use call bell to call for assist when ready to transfer back to bed - they verbalized understanding.    Patient does  have 24 hour care at home provided by family members, per daughter.    Rehab Prognosis: Good; patient would benefit from acute skilled PT services to address these deficits and reach maximum level of function.    Recent Surgery: * No surgery found *      Plan:     During this hospitalization, patient to be seen  (3-5x a week, 1-2x a day as needed) to address the identified rehab impairments via gait training, therapeutic activities, therapeutic exercises and progress toward the following goals:    Plan of Care Expires:       Subjective     Chief Complaint: shortness of breath with resulting weakness.  Patient/Family Comments/goals: To figure out what to do to help the shortness of breath.  Pain/Comfort:  Pain Rating 1: 0/10    Patients cultural, spiritual, Jainism conflicts given the current situation:      Living Environment:  Information provided by patient's daughter who was present: patient lives in a single story home with 1 step to enter, lives with her spouse with is 93, was an independent ambulator (no history of falls), on room air at home and independent with basic ADLs, does have a shower chair. Daughter lives next door and family provides them 24/7 care.    Equipment used at home:  shower chair.  DME owned (not currently used): none.  Upon discharge, patient will have assistance from daughter and family.    Objective:     Communicated with nurse prior to session.  Patient found HOB elevated with peripheral IV, oxygen, bed alarm, telemetry  upon PT entry to room.    General Precautions: Standard, fall  Orthopedic Precautions:N/A   Braces: N/A  Respiratory Status: Nasal cannula, flow 2 L/min    Exams:  RLE ROM: WFL  RLE Strength: WFL  LLE ROM: WFL  LLE Strength: WFL    Functional Mobility:  Bed Mobility:     Supine to Sit: contact guard assistance  Transfers:     Sit to Stand:  contact guard assistance with rolling walker  Bed to Chair: contact guard assistance with  rolling walker  using  Step  Transfer  Gait:  Pt ambulated ~40ft with RW, on 2L supplemental O2 with WC follow and CGA (patient was instructed to notify this therapist when she became tired or SOB), this therapist noted patient becoming SOB at ~40ft and so returned to sitting Vitals 92-93% with  - patient stated she felt fine and ready to walk out of the hospital but was audibly SOB. Returned to patient's room and transferred to recliner, vitals reassessed, SPO2 in 90's but HR noted to jump between 128-108 sporadically at rest post gait- notified patient's nurse of abnormal pulse, HR steady at 88 at end of session      AM-PAC 6 CLICK MOBILITY  Total Score:        Treatment & Education:  Please see assessment for full details    Patient left up in chair with all lines intact, call button in reach, chair alarm on, nurse notified, and daughter present.    GOALS:   Multidisciplinary Problems       Physical Therapy Goals          Problem: Physical Therapy    Goal Priority Disciplines Outcome Goal Variances Interventions   Physical Therapy Goal     PT, PT/OT Progressing     Description: Goals to be met by: discharge     Patient will increase functional independence with mobility by performin. Sit to stand transfer with Stand-by Assistance  2. Bed to chair transfer with Stand-by Assistance using Rolling Walker  3. Gait  x 75 feet with Stand-by Assistance using Rolling Walker.                          History:     Past Medical History:   Diagnosis Date    COPD (chronic obstructive pulmonary disease)     Hypertension     Thyroid disease        Past Surgical History:   Procedure Laterality Date    APPENDECTOMY      CHOLECYSTECTOMY      HYSTERECTOMY      SPINE SURGERY      TONSILLECTOMY         Time Tracking:     PT Received On: 24  PT Start Time: 911     PT Stop Time: 938  PT Total Time (min): 27 min     Billable Minutes: Evaluation 17 and Gait Training 10      2024

## 2024-07-16 NOTE — PLAN OF CARE
Problem: Adult Inpatient Plan of Care  Goal: Plan of Care Review  Outcome: Progressing  Goal: Patient-Specific Goal (Individualized)  Outcome: Progressing  Goal: Absence of Hospital-Acquired Illness or Injury  Outcome: Progressing  Goal: Optimal Comfort and Wellbeing  Outcome: Progressing  Goal: Readiness for Transition of Care  Outcome: Progressing     Problem: Gas Exchange Impaired  Goal: Optimal Gas Exchange  Outcome: Progressing     Problem: Pain Acute  Goal: Optimal Pain Control and Function  Outcome: Progressing     Problem: Fall Injury Risk  Goal: Absence of Fall and Fall-Related Injury  Outcome: Progressing     Problem: Comorbidity Management  Goal: Maintenance of Asthma Control  Outcome: Progressing  Goal: Maintenance of COPD Symptom Control  Outcome: Progressing  Goal: Blood Pressure in Desired Range  Outcome: Progressing     Problem: Hypertension Acute  Goal: Blood Pressure Within Desired Range  Outcome: Progressing     Problem: Functional Deficit  Goal: Improved Balance and Postural Control  Outcome: Progressing  Goal: Optimal Cognitive Function  Outcome: Progressing  Goal: Optimal Coordination  Outcome: Progressing  Goal: Improved Muscle Strength  Outcome: Progressing  Goal: Improved Muscle Tone  Outcome: Progressing  Goal: Optimal Range of Motion  Outcome: Progressing  Goal: Compensation for Sensory Deficit  Outcome: Progressing

## 2024-07-16 NOTE — SUBJECTIVE & OBJECTIVE
Past Medical History:   Diagnosis Date    COPD (chronic obstructive pulmonary disease)     Hypertension     Thyroid disease        Past Surgical History:   Procedure Laterality Date    APPENDECTOMY      CHOLECYSTECTOMY      HYSTERECTOMY      SPINE SURGERY      TONSILLECTOMY         Review of patient's allergies indicates:   Allergen Reactions    Ciprofloxacin Itching       No current facility-administered medications on file prior to encounter.     Current Outpatient Medications on File Prior to Encounter   Medication Sig    albuterol (PROVENTIL/VENTOLIN HFA) 90 mcg/actuation inhaler Inhale 2 puffs into the lungs every 6 (six) hours as needed for Wheezing. Rescue    albuterol-ipratropium (DUO-NEB) 2.5 mg-0.5 mg/3 mL nebulizer solution Take 3 mLs by nebulization every 6 (six) hours as needed for Wheezing. Rescue    albuterol-ipratropium (DUO-NEB) 2.5 mg-0.5 mg/3 mL nebulizer solution Take 3 mLs by nebulization every 4 (four) hours as needed for Wheezing.    benzonatate (TESSALON) 100 MG capsule Take 200 mg by mouth.    bisoprolol (ZEBETA) 5 MG tablet Take 5 mg by mouth.    budesonide (PULMICORT) 0.5 mg/2 mL nebulizer solution Take 2 mLs (0.5 mg total) by nebulization 2 (two) times a day. Controller    cetirizine (ZYRTEC) 10 MG tablet Take 10 mg by mouth.    dextromethorphan (DELSYM) 30 mg/5 mL liquid Take 60 mg by mouth 2 (two) times daily.    diclofenac (VOLTAREN) 75 MG EC tablet Take 75 mg by mouth 2 (two) times daily.    isosorbide mononitrate (IMDUR) 30 MG 24 hr tablet Take 30 mg by mouth every morning.    losartan (COZAAR) 50 MG tablet Take 50 mg by mouth.    lovastatin (MEVACOR) 40 MG tablet Take 40 mg by mouth.    memantine (NAMENDA) 10 MG Tab Take 5 mg by mouth.    montelukast (SINGULAIR) 10 mg tablet Take 1 tablet (10 mg total) by mouth every evening.    pantoprazole (PROTONIX) 40 MG tablet Take 40 mg by mouth once daily.    vitamin D (VITAMIN D3) 1000 units Tab Take 2,000 Units by mouth.     Family History        Problem Relation (Age of Onset)    Brain cancer Mother    Heart disease Father          Tobacco Use    Smoking status: Never    Smokeless tobacco: Never   Substance and Sexual Activity    Alcohol use: Not Currently    Drug use: Never    Sexual activity: Not Currently     Review of Systems   Constitutional:  Positive for activity change.   Respiratory:  Positive for cough, shortness of breath, wheezing and stridor.    Cardiovascular:  Negative for chest pain, palpitations and leg swelling.   Endocrine: Negative.    Genitourinary: Negative.    Musculoskeletal: Negative.    Skin: Negative.    Neurological: Negative.    Psychiatric/Behavioral: Negative.       Objective:     Vital Signs (Most Recent):  Temp: 97.6 °F (36.4 °C) (07/15/24 2106)  Pulse: 82 (07/15/24 2106)  Resp: (!) 24 (07/15/24 1915)  BP: 136/72 (07/15/24 2106)  SpO2: (!) 94 % (07/15/24 2106) Vital Signs (24h Range):  Temp:  [97.6 °F (36.4 °C)-98.2 °F (36.8 °C)] 97.6 °F (36.4 °C)  Pulse:  [80-86] 82  Resp:  [22-24] 24  SpO2:  [92 %-98 %] 94 %  BP: (136-166)/(62-76) 136/72        There is no height or weight on file to calculate BMI.     Physical Exam    Patient was alert and oriented x3.  She recognize me.  Has severe presbycusis daughter Adela is at the bedside.  Cranial nerves 2-12 are intact no JVD.  She was bronchophony decreased breath sounds bilateral lungs.  She has polyphonic breath sounds.  She is in mild respiratory accessory muscle usage.  Abdomen is soft nontender nondistended no clubbing cyanosis or edema bilateral lower extremities.           Significant Labs: All pertinent labs within the past 24 hours have been reviewed.  Recent Lab Results         07/15/24  1604   07/15/24  1528   07/15/24  1051   07/15/24  1039        RSV Ag by Molecular Method   Not Detected           Influenza A, Molecular   Not Detected           Influenza B, Molecular   Not Detected           Albumin/Globulin Ratio     0.9         Albumin     3.5         ALP      47         ALT     15         Anion Gap     8.0         Appearance, UA Clear             AST     24         Bacteria, UA Moderate             Baso #     0.03         Basophil %     0.2         Bilirubin (UA) Negative             BILIRUBIN TOTAL     1.0         BUN     13.0         BUN/CREAT RATIO     19         Calcium     9.4         Chloride     101         CO2     26         Color, UA Yellow             Creatinine     0.68         eGFR     >60         Eos #     0.00         Eos %     0.0         Globulin, Total     3.8         Glucose     133         Glucose, UA Negative             Hematocrit     37.6         Hemoglobin     12.5         Immature Grans (Abs)     0.11         Immature Granulocytes     0.8         Ketones, UA Negative             Leukocyte Esterase, UA 1+             Lymph #     1.76         LYMPH %     12.2         MCH     30.9         MCHC     33.2         MCV     92.8         Mono #     0.39         Mono %     2.7         MPV     9.5         Neut #     12.19         Neut %     84.1         NITRITE UA Positive             Blood, UA 2+             QRS Duration       70       OHS QTC Calculation       453       pH, UA 6.0             Platelet Count     208         Potassium     4.1         PROTEIN TOTAL     7.3         Protein, UA Negative             RBC     4.05         RBC, UA None Seen             RDW     12.3         SARS-CoV2 (COVID-19) Qualitative PCR   Not Detected           Sodium     135         Specific Gravity,UA 1.015             Squam Epithel, UA None Seen             Urobilinogen, UA 0.2             WBC, UA 0-5             WBC     14.48                 Significant Imaging: I have reviewed all pertinent imaging results/findings within the past 24 hours.

## 2024-07-16 NOTE — ASSESSMENT & PLAN NOTE
Chronic, continue BP meds. Latest blood pressure and vitals reviewed-     Temp:  [97.6 °F (36.4 °C)-98.2 °F (36.8 °C)]   Pulse:  [80-86]   Resp:  [22-24]   BP: (136-166)/(62-76)   SpO2:  [92 %-98 %] .   Home meds for hypertension were reviewed and noted below.   Hypertension Medications               bisoprolol (ZEBETA) 5 MG tablet Take 5 mg by mouth.    isosorbide mononitrate (IMDUR) 30 MG 24 hr tablet Take 30 mg by mouth every morning.    losartan (COZAAR) 50 MG tablet Take 50 mg by mouth.            While in the hospital, will manage blood pressure as follows; Continue home antihypertensive regimen    Will utilize p.r.n. blood pressure medication only if patient's blood pressure greater than 180/110 and she develops symptoms such as worsening chest pain or shortness of breath.

## 2024-07-17 LAB
ALBUMIN SERPL-MCNC: 3.3 G/DL (ref 3.4–4.8)
ALBUMIN/GLOB SERPL: 1 RATIO (ref 1.1–2)
ALP SERPL-CCNC: 40 UNIT/L (ref 40–150)
ALT SERPL-CCNC: 18 UNIT/L (ref 0–55)
ANION GAP SERPL CALC-SCNC: 8 MEQ/L
AST SERPL-CCNC: 29 UNIT/L (ref 5–34)
BASOPHILS # BLD AUTO: 0.02 X10(3)/MCL
BASOPHILS NFR BLD AUTO: 0.1 %
BILIRUB SERPL-MCNC: 0.5 MG/DL
BUN SERPL-MCNC: 12 MG/DL (ref 9.8–20.1)
CALCIUM SERPL-MCNC: 9.1 MG/DL (ref 8.4–10.2)
CHLORIDE SERPL-SCNC: 106 MMOL/L (ref 98–107)
CO2 SERPL-SCNC: 25 MMOL/L (ref 23–31)
CREAT SERPL-MCNC: 0.62 MG/DL (ref 0.55–1.02)
CREAT/UREA NIT SERPL: 19
EOSINOPHIL # BLD AUTO: 0 X10(3)/MCL (ref 0–0.9)
EOSINOPHIL NFR BLD AUTO: 0 %
ERYTHROCYTE [DISTWIDTH] IN BLOOD BY AUTOMATED COUNT: 12.7 % (ref 11.5–17)
GFR SERPLBLD CREATININE-BSD FMLA CKD-EPI: >60 ML/MIN/1.73/M2
GLOBULIN SER-MCNC: 3.3 GM/DL (ref 2.4–3.5)
GLUCOSE SERPL-MCNC: 144 MG/DL (ref 82–115)
HCT VFR BLD AUTO: 35.1 % (ref 37–47)
HGB BLD-MCNC: 11.9 G/DL (ref 12–16)
IMM GRANULOCYTES # BLD AUTO: 0.17 X10(3)/MCL (ref 0–0.04)
IMM GRANULOCYTES NFR BLD AUTO: 1 %
LYMPHOCYTES # BLD AUTO: 1.7 X10(3)/MCL (ref 0.6–4.6)
LYMPHOCYTES NFR BLD AUTO: 10.4 %
MCH RBC QN AUTO: 31 PG (ref 27–31)
MCHC RBC AUTO-ENTMCNC: 33.9 G/DL (ref 33–36)
MCV RBC AUTO: 91.4 FL (ref 80–94)
MONOCYTES # BLD AUTO: 0.58 X10(3)/MCL (ref 0.1–1.3)
MONOCYTES NFR BLD AUTO: 3.5 %
NEUTROPHILS # BLD AUTO: 13.88 X10(3)/MCL (ref 2.1–9.2)
NEUTROPHILS NFR BLD AUTO: 85 %
PLATELET # BLD AUTO: 230 X10(3)/MCL (ref 130–400)
PMV BLD AUTO: 10.4 FL (ref 7.4–10.4)
POTASSIUM SERPL-SCNC: 3.7 MMOL/L (ref 3.5–5.1)
PROT SERPL-MCNC: 6.6 GM/DL (ref 5.8–7.6)
RBC # BLD AUTO: 3.84 X10(6)/MCL (ref 4.2–5.4)
SODIUM SERPL-SCNC: 139 MMOL/L (ref 136–145)
WBC # BLD AUTO: 16.35 X10(3)/MCL (ref 4.5–11.5)

## 2024-07-17 PROCEDURE — 25000003 PHARM REV CODE 250: Performed by: FAMILY MEDICINE

## 2024-07-17 PROCEDURE — 97116 GAIT TRAINING THERAPY: CPT

## 2024-07-17 PROCEDURE — 94640 AIRWAY INHALATION TREATMENT: CPT

## 2024-07-17 PROCEDURE — 36415 COLL VENOUS BLD VENIPUNCTURE: CPT | Performed by: INTERNAL MEDICINE

## 2024-07-17 PROCEDURE — 97110 THERAPEUTIC EXERCISES: CPT

## 2024-07-17 PROCEDURE — 27000207 HC ISOLATION

## 2024-07-17 PROCEDURE — 63600175 PHARM REV CODE 636 W HCPCS: Performed by: INTERNAL MEDICINE

## 2024-07-17 PROCEDURE — 85025 COMPLETE CBC W/AUTO DIFF WBC: CPT | Performed by: INTERNAL MEDICINE

## 2024-07-17 PROCEDURE — 25000003 PHARM REV CODE 250: Performed by: INTERNAL MEDICINE

## 2024-07-17 PROCEDURE — 21400001 HC TELEMETRY ROOM

## 2024-07-17 PROCEDURE — 99900035 HC TECH TIME PER 15 MIN (STAT)

## 2024-07-17 PROCEDURE — 94761 N-INVAS EAR/PLS OXIMETRY MLT: CPT

## 2024-07-17 PROCEDURE — 80053 COMPREHEN METABOLIC PANEL: CPT | Performed by: INTERNAL MEDICINE

## 2024-07-17 PROCEDURE — 25000242 PHARM REV CODE 250 ALT 637 W/ HCPCS: Performed by: INTERNAL MEDICINE

## 2024-07-17 PROCEDURE — 27000221 HC OXYGEN, UP TO 24 HOURS

## 2024-07-17 RX ORDER — ALPRAZOLAM 0.25 MG/1
0.25 TABLET ORAL 3 TIMES DAILY PRN
Status: DISCONTINUED | OUTPATIENT
Start: 2024-07-17 | End: 2024-07-22 | Stop reason: HOSPADM

## 2024-07-17 RX ORDER — FAMOTIDINE 20 MG/1
20 TABLET, FILM COATED ORAL DAILY
Status: DISCONTINUED | OUTPATIENT
Start: 2024-07-18 | End: 2024-07-22 | Stop reason: HOSPADM

## 2024-07-17 RX ADMIN — IPRATROPIUM BROMIDE AND ALBUTEROL SULFATE 3 ML: .5; 3 SOLUTION RESPIRATORY (INHALATION) at 07:07

## 2024-07-17 RX ADMIN — BUDESONIDE 0.5 MG: 0.5 INHALANT RESPIRATORY (INHALATION) at 07:07

## 2024-07-17 RX ADMIN — METHYLPREDNISOLONE SODIUM SUCCINATE 80 MG: 125 INJECTION INTRAMUSCULAR; INTRAVENOUS at 06:07

## 2024-07-17 RX ADMIN — LOSARTAN POTASSIUM 50 MG: 50 TABLET, FILM COATED ORAL at 08:07

## 2024-07-17 RX ADMIN — ALPRAZOLAM 0.25 MG: 0.25 TABLET ORAL at 10:07

## 2024-07-17 RX ADMIN — IPRATROPIUM BROMIDE AND ALBUTEROL SULFATE 3 ML: .5; 3 SOLUTION RESPIRATORY (INHALATION) at 03:07

## 2024-07-17 RX ADMIN — BENZONATATE 100 MG: 100 CAPSULE ORAL at 11:07

## 2024-07-17 RX ADMIN — GUAIFENESIN 600 MG: 600 TABLET, EXTENDED RELEASE ORAL at 10:07

## 2024-07-17 RX ADMIN — METHYLPREDNISOLONE SODIUM SUCCINATE 80 MG: 125 INJECTION INTRAMUSCULAR; INTRAVENOUS at 02:07

## 2024-07-17 RX ADMIN — METHYLPREDNISOLONE SODIUM SUCCINATE 40 MG: 40 INJECTION, POWDER, FOR SOLUTION INTRAMUSCULAR; INTRAVENOUS at 09:07

## 2024-07-17 RX ADMIN — AZITHROMYCIN MONOHYDRATE 500 MG: 500 INJECTION, POWDER, LYOPHILIZED, FOR SOLUTION INTRAVENOUS at 10:07

## 2024-07-17 RX ADMIN — ATORVASTATIN CALCIUM 10 MG: 10 TABLET, FILM COATED ORAL at 08:07

## 2024-07-17 RX ADMIN — ENOXAPARIN SODIUM 40 MG: 40 INJECTION SUBCUTANEOUS at 04:07

## 2024-07-17 RX ADMIN — CEFTRIAXONE SODIUM 1 G: 1 INJECTION, POWDER, FOR SOLUTION INTRAMUSCULAR; INTRAVENOUS at 04:07

## 2024-07-17 RX ADMIN — IPRATROPIUM BROMIDE AND ALBUTEROL SULFATE 3 ML: .5; 3 SOLUTION RESPIRATORY (INHALATION) at 11:07

## 2024-07-17 RX ADMIN — GUAIFENESIN 600 MG: 600 TABLET, EXTENDED RELEASE ORAL at 08:07

## 2024-07-17 RX ADMIN — ISOSORBIDE MONONITRATE 30 MG: 30 TABLET, EXTENDED RELEASE ORAL at 08:07

## 2024-07-17 NOTE — PROGRESS NOTES
[facility]  [address] [place] [address]  Phone: [phone]    (Hospital) Internal Medicine  Progress Note      PATIENT NAME: Renata Escalona  MRN: [id number]  TODAY'S DATE: 07/16/2024  ADMIT DATE: 7/15/2024    SUBJECTIVE     PRINCIPLE PROBLEM: Exacerbation of asthma    INTERVAL HISTORY:    7/16/2024  Patient was showing minimal signs of improvement since he was last night.  She is still was in mild respiratory distress requiring couple L of oxygen.  See exam below but she was moving better air.  Patient underwent CT angiogram to rule out PE which was done successfully.  Her respiratory PCR panel came back as enterovirus/rhinovirus    Review of patient's allergies indicates:   Allergen Reactions    Ciprofloxacin Itching       ROS Shortness of breaths, weakness, cough.    OBJECTIVE     VITAL SIGNS (Most Recent)  Temp: 98.1 °F (36.7 °C) (07/16/24 1545)  Pulse: 90 (07/16/24 1921)  Resp: 20 (07/16/24 1921)  BP: (!) 145/71 (07/16/24 1545)  SpO2: 98 % (07/16/24 1921)    VENTILATION STATUS  Resp: 20 (07/16/24 1921)  SpO2: 98 % (07/16/24 1921)           I & O (Last 24H):  Intake/Output Summary (Last 24 hours) at 7/16/2024 2204  Last data filed at 7/16/2024 1844  Gross per 24 hour   Intake 1210 ml   Output 400 ml   Net 810 ml       WEIGHTS  Wt Readings from Last 3 Encounters:   07/13/24 1046 56.7 kg (125 lb)   06/27/24 0800 59 kg (130 lb)   05/10/24 1003 59 kg (130 lb)       Physical Exam    On physical exam she is alert and oriented x3 she has with the daughter Nidia this morning and Adela this evening cranial nerves 2-12 are intact.  No JVD regular rate and rhythm no rubs gallops or murmurs she was increased mild improvement of her airway upper apices with some continued proliferative any and decreased breath sounds at the bases.  Abdomen is soft nontender nondistended no clubbing cyanosis or edema bilateral lower extremities    SCHEDULED MEDS:   albuterol-ipratropium  3 mL Nebulization  "Q4H    azithromycin  500 mg Intravenous Q24H    budesonide  0.5 mg Nebulization Q12H    cefTRIAXone (Rocephin) IV (PEDS and ADULTS)  1 g Intravenous Q24H    hydrocortisone   Rectal BID    isosorbide mononitrate  30 mg Oral Daily    losartan  50 mg Oral Daily    methylPREDNISolone injection (PEDS and ADULTS)  80 mg Intravenous Q8H       CONTINUOUS INFUSIONS:    PRN MEDS:  Current Facility-Administered Medications:     acetaminophen, 650 mg, Oral, Q8H PRN    benzonatate, 100 mg, Oral, Q6H PRN    dextrose 10%, 12.5 g, Intravenous, PRN    dextrose 10%, 25 g, Intravenous, PRN    glucagon (human recombinant), 1 mg, Intramuscular, PRN    naloxone, 0.02 mg, Intravenous, PRN    ondansetron, 4 mg, Intravenous, Q4H PRN    sodium chloride 0.9%, 10 mL, Intravenous, Q12H PRN    LABS AND DIAGNOSTICS     CBC LAST 3 DAYS  Recent Labs   Lab 07/13/24  1120 07/15/24  1051 07/16/24  0515   WBC 13.73* 14.48* 12.57*   RBC 4.05* 4.05* 3.63*   HGB 12.5 12.5 11.3*   HCT 37.4 37.6 33.0*   MCV 92.3 92.8 90.9   MCH 30.9 30.9 31.1*   MCHC 33.4 33.2 34.2   RDW 12.4 12.3 12.2    208 196   MPV 9.9 9.5 10.1       COAGULATION LAST 3 DAYS  No results for input(s): "LABPT", "INR", "APTT" in the last 168 hours.    CHEMISTRY LAST 3 DAYS  Recent Labs   Lab 07/13/24  1120 07/15/24  1051 07/15/24  2221 07/16/24  0515    135*  --  136   K 3.8 4.1  --  3.9    101  --  104   CO2 26 26  --  26   BUN 11.0 13.0  --  12.0   CREATININE 0.79 0.68  --  0.66   CALCIUM 10.5* 9.4  --  9.1   PH  --   --  7.413  --    ALBUMIN 3.7 3.5  --  3.2*   ALKPHOS 44 47  --  40   ALT 12 15  --  16   AST 21 24  --  21   BILITOT 1.1 1.0  --  0.7       CARDIAC PROFILE LAST 3 DAYS  Recent Labs   Lab 07/13/24  1120   .3*   TROPONINI 0.027       ENDOCRINE LAST 3 DAYS  No results for input(s): "TSH", "PROCAL" in the last 168 hours.    LAST ARTERIAL BLOOD GAS  ABG  Recent Labs   Lab 07/15/24  2221   PH 7.413   PO2 75*   PCO2 40.4   HCO3 25.8   BE 1       LAST 7 " DAYS MICROBIOLOGY   Microbiology Results (last 7 days)       ** No results found for the last 168 hours. **            MOST RECENT IMAGING  CT Chest With Contrast  Narrative: EXAMINATION:  CT CHEST WITH CONTRAST    CLINICAL HISTORY:  Dyspnea, chronic, unclear etiology;, .    TECHNIQUE:  PATIENT RADIATION DOSE: DLP(mGycm) 156    As per PQRS measures, all CT scans at this facility used dose modulation, iterative reconstruction, and/or weight based dose adjustment when appropriate to reduce radiation dose to as low as reasonably achievable.    COMPARISON:  05/14/2024    FINDINGS:  Serial axial imaging of the chest with the administration of IV contrast.  Both mediastinal and pulmonary parenchymal windows were obtained.  Additional sagittal and coronal reconstructions were performed.Degenerative changes are noted to the thoracolumbar spine.  Compression deformities and vertebroplasty changes again evident at T12 T8 and T5.  There is heterogeneity of bone trabecula suggesting osteopenia.  Thyroid lobes are diminutive in size.  Atherosclerosis is seen within the aorta and branching vessels.  There is mild prominence of the ascending aorta measuring 3 point 5 cm in AP diameter.  A few mildly prominent lymph nodes seen within the precarinal space and subcarinal space.  No axillary lymphadenopathy is identified.  There is mucosal prominence at a small to moderate size hiatus hernia.  The heart is borderline enlarged.  The airways are grossly patent.  There is motion artifact.  Nodular wedge-shaped atelectasis and/or scarring has developed at the anterolateral right middle lobe.  Nodular atelectasis and scarring is again evident at the lateral lingular segment.  No effusion is seen.  Impression: 1. Interim development of nodular wedge-shaped atelectasis and/or scarring at the lateral right middle lobe with persistent nodular atelectasis and or scarring again evident at the lingular segment  2. Mucosal prominence at a small to  moderate size hiatus hernia  3. Borderline cardiomegaly  4. Findings and other details as above    Electronically signed by: Alin Sharif  Date:    07/16/2024  Time:    15:07      LASTECHO  No results found for this or any previous visit.      CURRENT/PREVIOUS VISIT EKG  Results for orders placed or performed during the hospital encounter of 07/15/24   EKG 12-lead    Collection Time: 07/15/24 10:39 AM   Result Value Ref Range    QRS Duration 70 ms    OHS QTC Calculation 453 ms    Narrative    Test Reason : R07.9,    Vent. Rate : 072 BPM     Atrial Rate : 072 BPM     P-R Int : 156 ms          QRS Dur : 070 ms      QT Int : 414 ms       P-R-T Axes : 068 038 032 degrees     QTc Int : 453 ms    Normal sinus rhythm  Normal ECG  When compared with ECG of 13-JUL-2024 10:51,  No significant change was found  Confirmed by Tru Santana MD ([id number]) on 7/15/2024 11:24:26 AM    Referred By: REJI BROWNLEE           Confirmed By:Tru Santana MD       ASSESSMENT/PLAN:     Active Hospital Problems    Diagnosis    *Exacerbation of asthma    Primary hypertension    Hypoxemia       ASSESSMENT & PLAN:   Will continue breathing treatments.  I will add Mucomyst because of the bronchophony.  Will continue IV fluids gently and continue mucolytics.  I will add on some guaifenesin.  Will also add on some Tessalon Perles.    Will continue his Synthroid home dose from the family by name brand per their request.  And statin.      RECOMMENDATIONS:  I will add on DVT prophylaxis with Lovenox and get her involved with physical therapy.  Will add on famotidine well        Reji Brownlee III, MD  Department of Hospital Medicine ([facility])   Date of Service: 07/16/2024  10:04 PM

## 2024-07-17 NOTE — PROGRESS NOTES
"Inpatient Nutrition Evaluation    Admit Date: 7/15/2024   Total duration of encounter: 2 days    Nutrition Recommendation/Prescription     Continue Heart Healthy Diet as tolerated.       RD following and available as needed. Thank you.     Nutrition Assessment     Chart Review    Reason Seen: continuous nutrition monitoring    Malnutrition Screening Tool Results   Have you recently lost weight without trying?: No  Have you been eating poorly because of a decreased appetite?: No   MST Score: 0     Diagnosis:  Exacerbation of Asthma.     Relevant Medical History:   COPD (chronic obstructive pulmonary disease)      Hypertension      Thyroid diseas          Nutrition-Related Medications:   Lovenox; Methylprednisolone.     Nutrition-Related Labs:  7/17: WBC 16.35(H); H/H 11.9/35.1(L); (H); Alb 3.3(L).    Diet Order: Diet Heart Healthy  Oral Supplement Order: none  Appetite/Oral Intake: good/50-75% of meals  Factors Affecting Nutritional Intake: none identified  Food/Church/Cultural Preferences: none reported  Food Allergies: none reported    Skin Integrity: intact  Wound(s):       Comments    7/17: Pt with good appetite and intake. No recent weight loss noted/reported. Will continue to monitor during stay.     Anthropometrics    Height: 5' 2" (157.5 cm)    Last Weight: 60.8 kg (134 lb) (07/17/24 1015)    BMI (Calculated): 24.5  BMI Classification: normal (BMI 18.5-24.9)     Ideal Body Weight (IBW), Female: 110 lb     % Ideal Body Weight, Female (lb): 121.82 %                             Usual Weight Provided By: EMR weight history    Wt Readings from Last 5 Encounters:   07/17/24 60.8 kg (134 lb)   07/13/24 56.7 kg (125 lb)   06/27/24 59 kg (130 lb)   05/10/24 59 kg (130 lb)   04/30/24 61 kg (134 lb 6.4 oz)     Weight Change(s) Since Admission:  Admit Weight: 60.8 kg (134 lb) (07/17/24 1015)      Patient Education    Not applicable.    Monitoring & Evaluation     Dietitian will monitor food and beverage intake, " energy intake, weight, weight change, electrolyte/renal panel, glucose/endocrine profile, and gastrointestinal profile.  Nutrition Risk/Follow-Up: low (follow-up in 5-7 days)  Patients assigned 'low nutrition risk' status do not qualify for a full nutritional assessment but will be monitored and re-evaluated in a 5-7 day time period. Please consult if re-evaluation needed sooner.

## 2024-07-17 NOTE — PT/OT/SLP PROGRESS
Physical Therapy Treatment    Patient Name:  Renata Escalona   MRN:  [id number]    Recommendations:     Discharge Recommendations: Moderate Intensity Therapy (SNF vs home health depending on progress)  Discharge Equipment Recommendations: walker, rolling  Barriers to discharge:  medical condition,     Assessment:     Renata Escalona is a 88 y.o. female admitted with a medical diagnosis of Exacerbation of asthma.  She presents with the following impairments/functional limitations: weakness, impaired endurance, impaired cognition, impaired self care skills, impaired functional mobility, gait instability, impaired balance, decreased safety awareness .    Rehab Prognosis: Good; patient would benefit from acute skilled PT services to address these deficits and reach maximum level of function.    Recent Surgery: * No surgery found *      Plan:     During this hospitalization, patient to be seen  (3-5x a week, 1-2x a day as needed) to address the identified rehab impairments via gait training, therapeutic activities, therapeutic exercises and progress toward the following goals:    Plan of Care Expires:       Subjective     Chief Complaint: shortness of breath  Patient/Family Comments/goals: no goals stated at this time.  Pain/Comfort:  Pain Rating 1: 0/10      Objective:     Communicated with nurse prior to session.  Patient found up in chair with peripheral IV, oxygen, bed alarm, telemetry upon PT entry to room.     General Precautions: Standard, fall  Orthopedic Precautions: N/A  Braces: N/A  Respiratory Status: Nasal cannula, flow 1 L/min     Functional Mobility:  Transfers:     Sit to Stand:  contact guard assistance with rolling walker  Toilet Transfer: contact guard assistance with  rolling walker  using  Step Transfer  Gait: Pt ambulated 60ft with RW and SBA on 1L oxygen (SPO2 and HR monitored, SPO2 decreased to 88% at the lowest but recovered quickly, HR increased to 132 but came back down to 111 with seated rest,  ambulated again for 30ft with RW and SBA before returning to the recliner. Performed [place] to [facility] [place] mod A for LB hygiene mgmt and cues for safety with patient able to assist with pericare. Patient was able to assist with upper body hygiene via sponge bath (mod A), total assist for gown change.      AM-PAC 6 CLICK MOBILITY          Treatment & Education:  Please see above. Patient is progressing slowly but steadily. Continues to exhibit endurance deficits but exhibits less SOB compared to previous day. Exhibited good stability with all mobility.     Patient left up in chair with all lines intact, call button in reach, chair alarm on, nurse notified, and daughter present..    GOALS:   Multidisciplinary Problems       Physical Therapy Goals          Problem: Physical Therapy    Goal Priority Disciplines Outcome Goal Variances Interventions   Physical Therapy Goal     PT, PT/OT Progressing     Description: Goals to be met by: discharge     Patient will increase functional independence with mobility by performin. Sit to stand transfer with Stand-by Assistance  2. Bed to chair transfer with Stand-by Assistance using Rolling Walker  3. Gait  x 75 feet with Stand-by Assistance using Rolling Walker.                          Time Tracking:     PT Received On: 24  PT Start Time: 1324     PT Stop Time: 1356  PT Total Time (min): 32 min     Billable Minutes: Gait Training 10 and Therapeutic Activity 22    Treatment Type: Treatment  PT/PTA: PT           2024

## 2024-07-18 LAB
ALBUMIN SERPL-MCNC: 3.3 G/DL (ref 3.4–4.8)
ALBUMIN/GLOB SERPL: 1.1 RATIO (ref 1.1–2)
ALP SERPL-CCNC: 39 UNIT/L (ref 40–150)
ALT SERPL-CCNC: 22 UNIT/L (ref 0–55)
ANION GAP SERPL CALC-SCNC: 7 MEQ/L
AST SERPL-CCNC: 26 UNIT/L (ref 5–34)
BASOPHILS # BLD AUTO: 0.03 X10(3)/MCL
BASOPHILS NFR BLD AUTO: 0.2 %
BILIRUB SERPL-MCNC: 0.5 MG/DL
BUN SERPL-MCNC: 12 MG/DL (ref 9.8–20.1)
CALCIUM SERPL-MCNC: 9 MG/DL (ref 8.4–10.2)
CHLORIDE SERPL-SCNC: 104 MMOL/L (ref 98–107)
CO2 SERPL-SCNC: 25 MMOL/L (ref 23–31)
CREAT SERPL-MCNC: 0.7 MG/DL (ref 0.55–1.02)
CREAT/UREA NIT SERPL: 17
EOSINOPHIL # BLD AUTO: 0 X10(3)/MCL (ref 0–0.9)
EOSINOPHIL NFR BLD AUTO: 0 %
ERYTHROCYTE [DISTWIDTH] IN BLOOD BY AUTOMATED COUNT: 12.6 % (ref 11.5–17)
GFR SERPLBLD CREATININE-BSD FMLA CKD-EPI: >60 ML/MIN/1.73/M2
GLOBULIN SER-MCNC: 3.1 GM/DL (ref 2.4–3.5)
GLUCOSE SERPL-MCNC: 120 MG/DL (ref 82–115)
HCT VFR BLD AUTO: 35.2 % (ref 37–47)
HGB BLD-MCNC: 11.7 G/DL (ref 12–16)
IMM GRANULOCYTES # BLD AUTO: 0.26 X10(3)/MCL (ref 0–0.04)
IMM GRANULOCYTES NFR BLD AUTO: 1.8 %
LYMPHOCYTES # BLD AUTO: 2.44 X10(3)/MCL (ref 0.6–4.6)
LYMPHOCYTES NFR BLD AUTO: 16.5 %
MCH RBC QN AUTO: 31 PG (ref 27–31)
MCHC RBC AUTO-ENTMCNC: 33.2 G/DL (ref 33–36)
MCV RBC AUTO: 93.1 FL (ref 80–94)
MONOCYTES # BLD AUTO: 0.75 X10(3)/MCL (ref 0.1–1.3)
MONOCYTES NFR BLD AUTO: 5.1 %
NEUTROPHILS # BLD AUTO: 11.34 X10(3)/MCL (ref 2.1–9.2)
NEUTROPHILS NFR BLD AUTO: 76.4 %
PLATELET # BLD AUTO: 219 X10(3)/MCL (ref 130–400)
PMV BLD AUTO: 9.9 FL (ref 7.4–10.4)
POTASSIUM SERPL-SCNC: 3.7 MMOL/L (ref 3.5–5.1)
PROT SERPL-MCNC: 6.4 GM/DL (ref 5.8–7.6)
RBC # BLD AUTO: 3.78 X10(6)/MCL (ref 4.2–5.4)
SODIUM SERPL-SCNC: 136 MMOL/L (ref 136–145)
WBC # BLD AUTO: 14.82 X10(3)/MCL (ref 4.5–11.5)

## 2024-07-18 PROCEDURE — 99900035 HC TECH TIME PER 15 MIN (STAT)

## 2024-07-18 PROCEDURE — 25000003 PHARM REV CODE 250: Performed by: FAMILY MEDICINE

## 2024-07-18 PROCEDURE — 63600175 PHARM REV CODE 636 W HCPCS: Performed by: INTERNAL MEDICINE

## 2024-07-18 PROCEDURE — 85025 COMPLETE CBC W/AUTO DIFF WBC: CPT | Performed by: INTERNAL MEDICINE

## 2024-07-18 PROCEDURE — 97530 THERAPEUTIC ACTIVITIES: CPT

## 2024-07-18 PROCEDURE — 21400001 HC TELEMETRY ROOM

## 2024-07-18 PROCEDURE — 27000221 HC OXYGEN, UP TO 24 HOURS

## 2024-07-18 PROCEDURE — 94640 AIRWAY INHALATION TREATMENT: CPT

## 2024-07-18 PROCEDURE — 25000003 PHARM REV CODE 250: Performed by: INTERNAL MEDICINE

## 2024-07-18 PROCEDURE — 97116 GAIT TRAINING THERAPY: CPT

## 2024-07-18 PROCEDURE — 94761 N-INVAS EAR/PLS OXIMETRY MLT: CPT

## 2024-07-18 PROCEDURE — 36415 COLL VENOUS BLD VENIPUNCTURE: CPT | Performed by: INTERNAL MEDICINE

## 2024-07-18 PROCEDURE — 80053 COMPREHEN METABOLIC PANEL: CPT | Performed by: INTERNAL MEDICINE

## 2024-07-18 PROCEDURE — 27000207 HC ISOLATION

## 2024-07-18 PROCEDURE — 25000242 PHARM REV CODE 250 ALT 637 W/ HCPCS: Performed by: INTERNAL MEDICINE

## 2024-07-18 RX ORDER — SODIUM CHLORIDE 9 MG/ML
INJECTION, SOLUTION INTRAVENOUS ONCE
Status: COMPLETED | OUTPATIENT
Start: 2024-07-19 | End: 2024-07-19

## 2024-07-18 RX ADMIN — GUAIFENESIN 600 MG: 600 TABLET, EXTENDED RELEASE ORAL at 09:07

## 2024-07-18 RX ADMIN — METHYLPREDNISOLONE SODIUM SUCCINATE 40 MG: 40 INJECTION, POWDER, FOR SOLUTION INTRAMUSCULAR; INTRAVENOUS at 02:07

## 2024-07-18 RX ADMIN — BUDESONIDE 0.5 MG: 0.5 INHALANT RESPIRATORY (INHALATION) at 07:07

## 2024-07-18 RX ADMIN — FAMOTIDINE 20 MG: 20 TABLET ORAL at 09:07

## 2024-07-18 RX ADMIN — METHYLPREDNISOLONE SODIUM SUCCINATE 40 MG: 40 INJECTION, POWDER, FOR SOLUTION INTRAMUSCULAR; INTRAVENOUS at 05:07

## 2024-07-18 RX ADMIN — LOSARTAN POTASSIUM 50 MG: 50 TABLET, FILM COATED ORAL at 09:07

## 2024-07-18 RX ADMIN — IPRATROPIUM BROMIDE AND ALBUTEROL SULFATE 3 ML: .5; 3 SOLUTION RESPIRATORY (INHALATION) at 03:07

## 2024-07-18 RX ADMIN — ATORVASTATIN CALCIUM 10 MG: 10 TABLET, FILM COATED ORAL at 09:07

## 2024-07-18 RX ADMIN — ISOSORBIDE MONONITRATE 30 MG: 30 TABLET, EXTENDED RELEASE ORAL at 09:07

## 2024-07-18 RX ADMIN — BUDESONIDE 0.5 MG: 0.5 INHALANT RESPIRATORY (INHALATION) at 08:07

## 2024-07-18 RX ADMIN — METHYLPREDNISOLONE SODIUM SUCCINATE 40 MG: 40 INJECTION, POWDER, FOR SOLUTION INTRAMUSCULAR; INTRAVENOUS at 10:07

## 2024-07-18 RX ADMIN — AZITHROMYCIN MONOHYDRATE 500 MG: 500 INJECTION, POWDER, LYOPHILIZED, FOR SOLUTION INTRAVENOUS at 10:07

## 2024-07-18 RX ADMIN — CEFTRIAXONE SODIUM 1 G: 1 INJECTION, POWDER, FOR SOLUTION INTRAMUSCULAR; INTRAVENOUS at 05:07

## 2024-07-18 RX ADMIN — IPRATROPIUM BROMIDE AND ALBUTEROL SULFATE 3 ML: .5; 3 SOLUTION RESPIRATORY (INHALATION) at 11:07

## 2024-07-18 RX ADMIN — HYDROCORTISONE 2.5%: 25 CREAM TOPICAL at 09:07

## 2024-07-18 RX ADMIN — IPRATROPIUM BROMIDE AND ALBUTEROL SULFATE 3 ML: .5; 3 SOLUTION RESPIRATORY (INHALATION) at 08:07

## 2024-07-18 RX ADMIN — IPRATROPIUM BROMIDE AND ALBUTEROL SULFATE 3 ML: .5; 3 SOLUTION RESPIRATORY (INHALATION) at 07:07

## 2024-07-18 RX ADMIN — ENOXAPARIN SODIUM 40 MG: 40 INJECTION SUBCUTANEOUS at 05:07

## 2024-07-18 NOTE — PROGRESS NOTES
[facility]  [address] [place] [address]  Phone: [phone]    (Hospital) Internal Medicine  Progress Note      PATIENT NAME: Renata Escalona  MRN: [id number]  TODAY'S DATE: 07/17/2024  ADMIT DATE: 7/15/2024    SUBJECTIVE     PRINCIPLE PROBLEM: Exacerbation of asthma    INTERVAL HISTORY:    7/17/2024  Can present in this evening around stated mother got confused earlier this morning.  She had received some medications to come down.  Rest of the day she was actually did better.  She started to move more air.  This evening she is complains less she was less short of breath.  Her CT again last night did not show any PE.  Her urine cultures have not grown out anything definitively.  Overall she states she is feeling better.  Patient was participating with physical therapy.    Review of patient's allergies indicates:   Allergen Reactions    Ciprofloxacin Itching       ROS-14 point review of systems except as mentioned above    OBJECTIVE     VITAL SIGNS (Most Recent)  Temp: 97.6 °F (36.4 °C) (07/17/24 2332)  Pulse: 102 (07/17/24 2339)  Resp: (!) 24 (07/17/24 2339)  BP: 139/69 (07/17/24 2332)  SpO2: (!) 92 % (07/17/24 2339)    VENTILATION STATUS  Resp: (!) 24 (07/17/24 2339)  SpO2: (!) 92 % (07/17/24 2339)           I & O (Last 24H):  Intake/Output Summary (Last 24 hours) at 7/17/2024 2352  Last data filed at 7/17/2024 1922  Gross per 24 hour   Intake 1223.63 ml   Output --   Net 1223.63 ml       WEIGHTS  Wt Readings from Last 3 Encounters:   07/17/24 1015 60.8 kg (134 lb)   07/13/24 1046 56.7 kg (125 lb)   06/27/24 0800 59 kg (130 lb)       Physical Exam    On physical exam she is alert and oriented x3 presbycusis.  Daughter Adela present.  Cranial nerves 2-12 are intact no JVD lungs are moving more air apically.  However she is still has some significant bronchophony in Respiratory rhonchi in the lower lung zones.  Abdomen is soft nontender nondistended no clubbing cyanosis or edema  "bilateral lower extremities.    SCHEDULED MEDS:   albuterol-ipratropium  3 mL Nebulization Q4H    atorvastatin  10 mg Oral Daily    azithromycin  500 mg Intravenous Q24H    budesonide  0.5 mg Nebulization Q12H    cefTRIAXone (Rocephin) IV (PEDS and ADULTS)  1 g Intravenous Q24H    enoxaparin  40 mg Subcutaneous Daily    guaiFENesin  600 mg Oral BID    hydrocortisone   Rectal BID    isosorbide mononitrate  30 mg Oral Daily    losartan  50 mg Oral Daily    methylPREDNISolone injection (PEDS and ADULTS)  40 mg Intravenous Q8H       CONTINUOUS INFUSIONS:    PRN MEDS:  Current Facility-Administered Medications:     acetaminophen, 650 mg, Oral, Q8H PRN    ALPRAZolam, 0.25 mg, Oral, TID PRN    benzonatate, 100 mg, Oral, Q6H PRN    dextrose 10%, 12.5 g, Intravenous, PRN    dextrose 10%, 25 g, Intravenous, PRN    glucagon (human recombinant), 1 mg, Intramuscular, PRN    naloxone, 0.02 mg, Intravenous, PRN    ondansetron, 4 mg, Intravenous, Q4H PRN    sodium chloride 0.9%, 10 mL, Intravenous, Q12H PRN    LABS AND DIAGNOSTICS     CBC LAST 3 DAYS  Recent Labs   Lab 07/15/24  1051 07/16/24  0515 07/17/24  0411   WBC 14.48* 12.57* 16.35*   RBC 4.05* 3.63* 3.84*   HGB 12.5 11.3* 11.9*   HCT 37.6 33.0* 35.1*   MCV 92.8 90.9 91.4   MCH 30.9 31.1* 31.0   MCHC 33.2 34.2 33.9   RDW 12.3 12.2 12.7    196 230   MPV 9.5 10.1 10.4       COAGULATION LAST 3 DAYS  No results for input(s): "LABPT", "INR", "APTT" in the last 168 hours.    CHEMISTRY LAST 3 DAYS  Recent Labs   Lab 07/15/24  1051 07/15/24  2221 07/16/24  0515 07/17/24  0411   *  --  136 139   K 4.1  --  3.9 3.7     --  104 106   CO2 26  --  26 25   BUN 13.0  --  12.0 12.0   CREATININE 0.68  --  0.66 0.62   CALCIUM 9.4  --  9.1 9.1   PH  --  7.413  --   --    ALBUMIN 3.5  --  3.2* 3.3*   ALKPHOS 47  --  40 40   ALT 15  --  16 18   AST 24  --  21 29   BILITOT 1.0  --  0.7 0.5       CARDIAC PROFILE LAST 3 DAYS  Recent Labs   Lab 07/13/24  1120   .3* " "  TROPONINI 0.027       ENDOCRINE LAST 3 DAYS  No results for input(s): "TSH", "PROCAL" in the last 168 hours.    LAST ARTERIAL BLOOD GAS  ABG  Recent Labs   Lab 07/15/24  2221   PH 7.413   PO2 75*   PCO2 40.4   HCO3 25.8   BE 1       LAST 7 DAYS MICROBIOLOGY   Microbiology Results (last 7 days)       ** No results found for the last 168 hours. **            MOST RECENT IMAGING  CT Chest With Contrast  Narrative: EXAMINATION:  CT CHEST WITH CONTRAST    CLINICAL HISTORY:  Dyspnea, chronic, unclear etiology;, .    TECHNIQUE:  PATIENT RADIATION DOSE: DLP(mGycm) 156    As per PQRS measures, all CT scans at this facility used dose modulation, iterative reconstruction, and/or weight based dose adjustment when appropriate to reduce radiation dose to as low as reasonably achievable.    COMPARISON:  05/14/2024    FINDINGS:  Serial axial imaging of the chest with the administration of IV contrast.  Both mediastinal and pulmonary parenchymal windows were obtained.  Additional sagittal and coronal reconstructions were performed.Degenerative changes are noted to the thoracolumbar spine.  Compression deformities and vertebroplasty changes again evident at T12 T8 and T5.  There is heterogeneity of bone trabecula suggesting osteopenia.  Thyroid lobes are diminutive in size.  Atherosclerosis is seen within the aorta and branching vessels.  There is mild prominence of the ascending aorta measuring 3 point 5 cm in AP diameter.  A few mildly prominent lymph nodes seen within the precarinal space and subcarinal space.  No axillary lymphadenopathy is identified.  There is mucosal prominence at a small to moderate size hiatus hernia.  The heart is borderline enlarged.  The airways are grossly patent.  There is motion artifact.  Nodular wedge-shaped atelectasis and/or scarring has developed at the anterolateral right middle lobe.  Nodular atelectasis and scarring is again evident at the lateral lingular segment.  No effusion is " seen.  Impression: 1. Interim development of nodular wedge-shaped atelectasis and/or scarring at the lateral right middle lobe with persistent nodular atelectasis and or scarring again evident at the lingular segment  2. Mucosal prominence at a small to moderate size hiatus hernia  3. Borderline cardiomegaly  4. Findings and other details as above    Electronically signed by: Alin Sharif  Date:    07/16/2024  Time:    15:07      LASTECHO  No results found for this or any previous visit.      CURRENT/PREVIOUS VISIT EKG  Results for orders placed or performed during the hospital encounter of 07/15/24   EKG 12-lead    Collection Time: 07/15/24 10:39 AM   Result Value Ref Range    QRS Duration 70 ms    [facility] QTC Calculation 453 ms    Narrative    Test Reason : R07.9,    Vent. 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  I have also written for some Xanax p.r.n..      Continue thyroid medications.    Will continue to monitor urinary cultures make sure then go back any definitive continue azithromycin for the lungs as well as Rocephin for the urine at this point     Will continue mucolytics and patient with pulmonary toilet.  She is doing better.  Continue PT.  Anticipate discharge possibly Friday versus Saturday.      RECOMMENDATIONS:  DVT prophylaxis enoxaparin will place the patient on some GI prophylaxis  because of the steroids.        Reji Duckworth III, MD  Department of Hospital Medicine ([facility])   Date of Service: 07/17/2024  11:52 PM

## 2024-07-18 NOTE — PLAN OF CARE
07/18/24 1441   Discharge Assessment   Assessment Type Discharge Planning Assessment   Source of Information patient;family   People in Home alone   Do you expect to return to your current living situation? Yes   Do you have help at home or someone to help you manage your care at home? Yes   Who are your caregiver(s) and their phone number(s)? fly   Prior to hospitilization cognitive status: Alert/Oriented;No Deficits   Current cognitive status: Alert/Oriented;No Deficits   Walking or Climbing Stairs Difficulty no   Dressing/Bathing Difficulty no   Equipment Currently Used at Home wheelchair   Do you currently have service(s) that help you manage your care at home? No   Do you take prescription medications? Yes   Do you have prescription coverage? Yes   Do you have any problems affording any of your prescribed medications? No   Is the patient taking medications as prescribed? yes   Who is going to help you get home at discharge? fly   How do you get to doctors appointments? family or friend will provide   Are you on dialysis? No   Do you take coumadin? No   Discharge Plan A Home with family   Discharge Plan B Home with family;Home Health   DME Needed Upon Discharge  none   Discharge Plan discussed with: Patient;Adult children   Transition of Care Barriers None   Physical Activity   On average, how many days per week do you engage in moderate to strenuous exercise (like a brisk walk)? Pt Declined   On average, how many minutes do you engage in exercise at this level? Pt Declined   Financial Resource Strain   How hard is it for you to pay for the very basics like food, housing, medical care, and heating? Pt Declined   Housing Stability   In the last 12 months, was there a time when you were not able to pay the mortgage or rent on time? Pt Declined   At any time in the past 12 months, were you homeless or living in a shelter (including now)? Pt Declined   Transportation Needs   Has the lack of transportation kept  you from medical appointments, meetings, work or from getting things needed for daily living? Patient declined   Food Insecurity   Within the past 12 months, you worried that your food would run out before you got the money to buy more. Pt Declined   Within the past 12 months, the food you bought just didn't last and you didn't have money to get more. Pt Declined   Stress   Do you feel stress - tense, restless, nervous, or anxious, or unable to sleep at night because your mind is troubled all the time - these days? Pt Declined   Social Isolation   How often do you feel lonely or isolated from those around you?  Patient declined   Alcohol Use   Q1: How often do you have a drink containing alcohol? Pt Declined   Q2: How many drinks containing alcohol do you have on a typical day when you are drinking? Pt Declined   Q3: How often do you have six or more drinks on one occasion? Pt Declined   Utilities   In the past 12 months has the electric, gas, oil, or water company threatened to shut off services in your home? Pt Declined   Health Literacy   How often do you need to have someone help you when you read instructions, pamphlets, or other written material from your doctor or pharmacy? Patient declines to respond

## 2024-07-18 NOTE — PLAN OF CARE
Problem: Adult Inpatient Plan of Care  Goal: Plan of Care Review  Outcome: Progressing  Goal: Patient-Specific Goal (Individualized)  Outcome: Progressing  Goal: Absence of Hospital-Acquired Illness or Injury  Outcome: Progressing  Goal: Optimal Comfort and Wellbeing  Outcome: Progressing  Goal: Readiness for Transition of Care  Outcome: Progressing     Problem: Gas Exchange Impaired  Goal: Optimal Gas Exchange  Outcome: Progressing     Problem: Pain Acute  Goal: Optimal Pain Control and Function  Outcome: Progressing     Problem: Fall Injury Risk  Goal: Absence of Fall and Fall-Related Injury  Outcome: Progressing     Problem: Comorbidity Management  Goal: Maintenance of Asthma Control  Outcome: Progressing  Goal: Maintenance of COPD Symptom Control  Outcome: Progressing  Goal: Blood Pressure in Desired Range  Outcome: Progressing     Problem: Hypertension Acute  Goal: Blood Pressure Within Desired Range  Outcome: Progressing     Problem: Functional Deficit  Goal: Improved Balance and Postural Control  Outcome: Progressing  Goal: Optimal Cognitive Function  Outcome: Progressing  Goal: Optimal Coordination  Outcome: Progressing  Goal: Improved Muscle Strength  Outcome: Progressing  Goal: Improved Muscle Tone  Outcome: Progressing  Goal: Optimal Range of Motion  Outcome: Progressing  Goal: Compensation for Sensory Deficit  Outcome: Progressing     Problem: Infection  Goal: Absence of Infection Signs and Symptoms  Outcome: Progressing

## 2024-07-18 NOTE — PT/OT/SLP PROGRESS
Lab orders faxed to [facility] attention Charline for labs to be done 3-5 days before his 6/2 appt.    Physical Therapy Treatment    Patient Name:  Renata Escalona   MRN:  [id number]    Recommendations:     Discharge Recommendations: Moderate Intensity Therapy (SNF vs home health depending on progress)  Discharge Equipment Recommendations: walker, rolling  Barriers to discharge:  medical condition,     Assessment:     Renata Escalona is a 88 y.o. female admitted with a medical diagnosis of Exacerbation of asthma.  She presents with the following impairments/functional limitations: weakness, impaired endurance, impaired cognition, impaired self care skills, impaired functional mobility, gait instability, impaired balance, decreased safety awareness .    Rehab Prognosis: Good; patient would benefit from acute skilled PT services to address these deficits and reach maximum level of function.    Recent Surgery: * No surgery found *      Plan:     During this hospitalization, patient to be seen  (3-5x a week, 1-2x a day as needed) to address the identified rehab impairments via gait training, therapeutic activities, therapeutic exercises and progress toward the following goals:    Plan of Care Expires:       Subjective     Chief Complaint: shortness of breath  Patient/Family Comments/goals: no goals stated at this time. Patient's daughter, Adela, was present for session.  Pain/Comfort:  Pain Rating 1: 0/10      Objective:     Communicated with nurse prior to session.  Patient found up in chair with oxygen, telemetry, pulse ox (continuous) upon PT entry to room.     General Precautions: Standard, fall  Orthopedic Precautions: N/A  Braces: N/A  Respiratory Status: Nasal cannula, flow 1 L/min     Functional Mobility:  Transfers:     Sit to Stand:  contact guard assistance with rolling walker  Toilet Transfer: contact guard assistance with  rolling walker  using  Step Transfer  Gait: Pt ambulated 2 bouts of ~22ft with RW and SBA on 1L oxygen with seated rest break in between each bout (SPO2 and HR monitored, SPO2 maintained to 92%  and above - did exhibit tachycardia with HR elevating from 96 to 150 briefly but stabilizing in 90's and low 100s with seated rest - did exhibit some wheezing. Patient is most limited by her tachycardia with mobility at this time - she does appear to be in good spirits.      AM-PAC 6 CLICK MOBILITY          Treatment & Education:  Please see above. Patient is progressing slowly but steadily. Continues to exhibit endurance deficits but exhibits less SOB compared to previous day but continues to exhibit tachycardia with mobility. Exhibited good stability with all mobility.     Patient left up in chair with all lines intact, call button in reach, chair alarm on, nurse notified, and daughter present..    GOALS:   Multidisciplinary Problems       Physical Therapy Goals          Problem: Physical Therapy    Goal Priority Disciplines Outcome Goal Variances Interventions   Physical Therapy Goal     PT, PT/OT Progressing     Description: Goals to be met by: discharge     Patient will increase functional independence with mobility by performin. Sit to stand transfer with Stand-by Assistance  2. Bed to chair transfer with Stand-by Assistance using Rolling Walker  3. Gait  x 75 feet with Stand-by Assistance using Rolling Walker.                          Time Tracking:     PT Received On: 24  PT Start Time: 1045     PT Stop Time: 1112  PT Total Time (min): 27 min     Billable Minutes: Gait Training 15 and Therapeutic Activity 12    Treatment Type: Treatment  PT/PTA: PT           2024

## 2024-07-19 LAB
ALBUMIN SERPL-MCNC: 3.2 G/DL (ref 3.4–4.8)
ALBUMIN/GLOB SERPL: 1.1 RATIO (ref 1.1–2)
ALP SERPL-CCNC: 38 UNIT/L (ref 40–150)
ALT SERPL-CCNC: 22 UNIT/L (ref 0–55)
ANION GAP SERPL CALC-SCNC: 8 MEQ/L
AST SERPL-CCNC: 26 UNIT/L (ref 5–34)
BASOPHILS # BLD AUTO: 0.06 X10(3)/MCL
BASOPHILS NFR BLD AUTO: 0.5 %
BILIRUB SERPL-MCNC: 0.5 MG/DL
BUN SERPL-MCNC: 11 MG/DL (ref 9.8–20.1)
CALCIUM SERPL-MCNC: 8.7 MG/DL (ref 8.4–10.2)
CHLORIDE SERPL-SCNC: 103 MMOL/L (ref 98–107)
CO2 SERPL-SCNC: 26 MMOL/L (ref 23–31)
CREAT SERPL-MCNC: 0.71 MG/DL (ref 0.55–1.02)
CREAT/UREA NIT SERPL: 15
EOSINOPHIL # BLD AUTO: 0 X10(3)/MCL (ref 0–0.9)
EOSINOPHIL NFR BLD AUTO: 0 %
ERYTHROCYTE [DISTWIDTH] IN BLOOD BY AUTOMATED COUNT: 12.6 % (ref 11.5–17)
GFR SERPLBLD CREATININE-BSD FMLA CKD-EPI: >60 ML/MIN/1.73/M2
GLOBULIN SER-MCNC: 3 GM/DL (ref 2.4–3.5)
GLUCOSE SERPL-MCNC: 139 MG/DL (ref 82–115)
HCT VFR BLD AUTO: 35.5 % (ref 37–47)
HGB BLD-MCNC: 11.8 G/DL (ref 12–16)
IMM GRANULOCYTES # BLD AUTO: 0.45 X10(3)/MCL (ref 0–0.04)
IMM GRANULOCYTES NFR BLD AUTO: 3.6 %
LYMPHOCYTES # BLD AUTO: 2.56 X10(3)/MCL (ref 0.6–4.6)
LYMPHOCYTES NFR BLD AUTO: 20.2 %
MAGNESIUM SERPL-MCNC: 2.5 MG/DL (ref 1.6–2.6)
MCH RBC QN AUTO: 30.5 PG (ref 27–31)
MCHC RBC AUTO-ENTMCNC: 33.2 G/DL (ref 33–36)
MCV RBC AUTO: 91.7 FL (ref 80–94)
MONOCYTES # BLD AUTO: 0.71 X10(3)/MCL (ref 0.1–1.3)
MONOCYTES NFR BLD AUTO: 5.6 %
NEUTROPHILS # BLD AUTO: 8.88 X10(3)/MCL (ref 2.1–9.2)
NEUTROPHILS NFR BLD AUTO: 70.1 %
PHOSPHATE SERPL-MCNC: 1.5 MG/DL (ref 2.3–4.7)
PLATELET # BLD AUTO: 239 X10(3)/MCL (ref 130–400)
PMV BLD AUTO: 10.1 FL (ref 7.4–10.4)
POTASSIUM SERPL-SCNC: 3.7 MMOL/L (ref 3.5–5.1)
PROT SERPL-MCNC: 6.2 GM/DL (ref 5.8–7.6)
RBC # BLD AUTO: 3.87 X10(6)/MCL (ref 4.2–5.4)
SODIUM SERPL-SCNC: 137 MMOL/L (ref 136–145)
WBC # BLD AUTO: 12.66 X10(3)/MCL (ref 4.5–11.5)

## 2024-07-19 PROCEDURE — 27000207 HC ISOLATION

## 2024-07-19 PROCEDURE — 63600175 PHARM REV CODE 636 W HCPCS: Performed by: INTERNAL MEDICINE

## 2024-07-19 PROCEDURE — 36415 COLL VENOUS BLD VENIPUNCTURE: CPT | Performed by: INTERNAL MEDICINE

## 2024-07-19 PROCEDURE — 97116 GAIT TRAINING THERAPY: CPT

## 2024-07-19 PROCEDURE — 85025 COMPLETE CBC W/AUTO DIFF WBC: CPT | Performed by: INTERNAL MEDICINE

## 2024-07-19 PROCEDURE — 94761 N-INVAS EAR/PLS OXIMETRY MLT: CPT

## 2024-07-19 PROCEDURE — 97110 THERAPEUTIC EXERCISES: CPT

## 2024-07-19 PROCEDURE — 25000242 PHARM REV CODE 250 ALT 637 W/ HCPCS: Performed by: INTERNAL MEDICINE

## 2024-07-19 PROCEDURE — 25000003 PHARM REV CODE 250: Performed by: FAMILY MEDICINE

## 2024-07-19 PROCEDURE — 80053 COMPREHEN METABOLIC PANEL: CPT | Performed by: INTERNAL MEDICINE

## 2024-07-19 PROCEDURE — 84100 ASSAY OF PHOSPHORUS: CPT | Performed by: INTERNAL MEDICINE

## 2024-07-19 PROCEDURE — 21400001 HC TELEMETRY ROOM

## 2024-07-19 PROCEDURE — 83735 ASSAY OF MAGNESIUM: CPT | Performed by: INTERNAL MEDICINE

## 2024-07-19 PROCEDURE — 25000003 PHARM REV CODE 250: Performed by: INTERNAL MEDICINE

## 2024-07-19 PROCEDURE — 97530 THERAPEUTIC ACTIVITIES: CPT

## 2024-07-19 PROCEDURE — 94640 AIRWAY INHALATION TREATMENT: CPT

## 2024-07-19 PROCEDURE — 27000221 HC OXYGEN, UP TO 24 HOURS

## 2024-07-19 PROCEDURE — 99900035 HC TECH TIME PER 15 MIN (STAT)

## 2024-07-19 RX ADMIN — GUAIFENESIN 600 MG: 600 TABLET, EXTENDED RELEASE ORAL at 09:07

## 2024-07-19 RX ADMIN — LOSARTAN POTASSIUM 50 MG: 50 TABLET, FILM COATED ORAL at 09:07

## 2024-07-19 RX ADMIN — ENOXAPARIN SODIUM 40 MG: 40 INJECTION SUBCUTANEOUS at 04:07

## 2024-07-19 RX ADMIN — IPRATROPIUM BROMIDE AND ALBUTEROL SULFATE 3 ML: .5; 3 SOLUTION RESPIRATORY (INHALATION) at 12:07

## 2024-07-19 RX ADMIN — BUDESONIDE 0.5 MG: 0.5 INHALANT RESPIRATORY (INHALATION) at 07:07

## 2024-07-19 RX ADMIN — IPRATROPIUM BROMIDE AND ALBUTEROL SULFATE 3 ML: .5; 3 SOLUTION RESPIRATORY (INHALATION) at 08:07

## 2024-07-19 RX ADMIN — AZITHROMYCIN MONOHYDRATE 500 MG: 500 INJECTION, POWDER, LYOPHILIZED, FOR SOLUTION INTRAVENOUS at 09:07

## 2024-07-19 RX ADMIN — SODIUM CHLORIDE: 9 INJECTION, SOLUTION INTRAVENOUS at 09:07

## 2024-07-19 RX ADMIN — BUDESONIDE 0.5 MG: 0.5 INHALANT RESPIRATORY (INHALATION) at 08:07

## 2024-07-19 RX ADMIN — FAMOTIDINE 20 MG: 20 TABLET ORAL at 09:07

## 2024-07-19 RX ADMIN — METHYLPREDNISOLONE SODIUM SUCCINATE 40 MG: 40 INJECTION, POWDER, FOR SOLUTION INTRAMUSCULAR; INTRAVENOUS at 06:07

## 2024-07-19 RX ADMIN — ATORVASTATIN CALCIUM 10 MG: 10 TABLET, FILM COATED ORAL at 09:07

## 2024-07-19 RX ADMIN — METHYLPREDNISOLONE SODIUM SUCCINATE 40 MG: 40 INJECTION, POWDER, FOR SOLUTION INTRAMUSCULAR; INTRAVENOUS at 02:07

## 2024-07-19 RX ADMIN — ISOSORBIDE MONONITRATE 30 MG: 30 TABLET, EXTENDED RELEASE ORAL at 09:07

## 2024-07-19 RX ADMIN — IPRATROPIUM BROMIDE AND ALBUTEROL SULFATE 3 ML: .5; 3 SOLUTION RESPIRATORY (INHALATION) at 03:07

## 2024-07-19 RX ADMIN — IPRATROPIUM BROMIDE AND ALBUTEROL SULFATE 3 ML: .5; 3 SOLUTION RESPIRATORY (INHALATION) at 07:07

## 2024-07-19 RX ADMIN — IPRATROPIUM BROMIDE AND ALBUTEROL SULFATE 3 ML: .5; 3 SOLUTION RESPIRATORY (INHALATION) at 11:07

## 2024-07-19 RX ADMIN — IPRATROPIUM BROMIDE AND ALBUTEROL SULFATE 3 ML: .5; 3 SOLUTION RESPIRATORY (INHALATION) at 04:07

## 2024-07-19 RX ADMIN — METHYLPREDNISOLONE SODIUM SUCCINATE 40 MG: 40 INJECTION, POWDER, FOR SOLUTION INTRAMUSCULAR; INTRAVENOUS at 09:07

## 2024-07-19 NOTE — PROGRESS NOTES
[facility]  [address] [place] [address]  Phone: [phone]    (Hospital) Internal Medicine  Progress Note      PATIENT NAME: Renata Escalona  MRN: [id number]  TODAY'S DATE: 07/18/2024  ADMIT DATE: 7/15/2024    SUBJECTIVE     PRINCIPLE PROBLEM: Exacerbation of asthma    INTERVAL HISTORY:    7/18/2024  Patient participated with physical therapy.  Patient was showing some signs of improvement in terms of her breathing.  See exam below.  She is not having any hallucinations.  Steroids were tapered down.  She is eating she is drinking she is having bowel movements.  She is not having any psychosis.    Review of patient's allergies indicates:   Allergen Reactions    Ciprofloxacin Itching       ROS-14 point review of systems except as mentioned above    OBJECTIVE     VITAL SIGNS (Most Recent)  Temp: 97.5 °F (36.4 °C) (07/18/24 2025)  Pulse: 88 (07/18/24 2026)  Resp: 18 (07/18/24 2026)  BP: (!) 176/86 (07/18/24 2025)  SpO2: 97 % (07/18/24 2026)    VENTILATION STATUS  Resp: 18 (07/18/24 2026)  SpO2: 97 % (07/18/24 2026)  Oxygen Concentration (%):  [24] 24        I & O (Last 24H):  Intake/Output Summary (Last 24 hours) at 7/18/2024 2225  Last data filed at 7/18/2024 1728  Gross per 24 hour   Intake 720 ml   Output --   Net 720 ml       WEIGHTS  Wt Readings from Last 3 Encounters:   07/17/24 1015 60.8 kg (134 lb)   07/13/24 1046 56.7 kg (125 lb)   06/27/24 0800 59 kg (130 lb)       Physical Exam    Patient was alert and oriented x3.  Presbycusis.  Her daughter Adela is present.  Cranial nerves 2-12 are intact no JVD.  Regular rate and rhythm no rubs gallops or murmurs.  Grade 2 holosystolic murmur.  She has improvement of air movement in the apices and the mid lungs.  She still has coarse pull if any from the mid lung zones down to the base.  This is quite refractory to treatment.    SCHEDULED MEDS:   albuterol-ipratropium  3 mL Nebulization Q4H    atorvastatin  10 mg Oral Daily     "azithromycin  500 mg Intravenous Q24H    budesonide  0.5 mg Nebulization Q12H    cefTRIAXone (Rocephin) IV (PEDS and ADULTS)  1 g Intravenous Q24H    enoxaparin  40 mg Subcutaneous Daily    famotidine  20 mg Oral Daily    guaiFENesin  600 mg Oral BID    hydrocortisone   Rectal BID    isosorbide mononitrate  30 mg Oral Daily    losartan  50 mg Oral Daily    methylPREDNISolone injection (PEDS and ADULTS)  40 mg Intravenous Q8H       CONTINUOUS INFUSIONS:    PRN MEDS:  Current Facility-Administered Medications:     acetaminophen, 650 mg, Oral, Q8H PRN    ALPRAZolam, 0.25 mg, Oral, TID PRN    benzonatate, 100 mg, Oral, Q6H PRN    dextrose 10%, 12.5 g, Intravenous, PRN    dextrose 10%, 25 g, Intravenous, PRN    glucagon (human recombinant), 1 mg, Intramuscular, PRN    naloxone, 0.02 mg, Intravenous, PRN    ondansetron, 4 mg, Intravenous, Q4H PRN    sodium chloride 0.9%, 10 mL, Intravenous, Q12H PRN    LABS AND DIAGNOSTICS     CBC LAST 3 DAYS  Recent Labs   Lab 07/16/24 0515 07/17/24 0411 07/18/24 0421   WBC 12.57* 16.35* 14.82*   RBC 3.63* 3.84* 3.78*   HGB 11.3* 11.9* 11.7*   HCT 33.0* 35.1* 35.2*   MCV 90.9 91.4 93.1   MCH 31.1* 31.0 31.0   MCHC 34.2 33.9 33.2   RDW 12.2 12.7 12.6    230 219   MPV 10.1 10.4 9.9       COAGULATION LAST 3 DAYS  No results for input(s): "LABPT", "INR", "APTT" in the last 168 hours.    CHEMISTRY LAST 3 DAYS  Recent Labs   Lab 07/15/24  2221 07/16/24 0515 07/17/24 0411 07/18/24  0421   NA  --  136 139 136   K  --  3.9 3.7 3.7   CL  --  104 106 104   CO2  --  26 25 25   BUN  --  12.0 12.0 12.0   CREATININE  --  0.66 0.62 0.70   CALCIUM  --  9.1 9.1 9.0   PH 7.413  --   --   --    ALBUMIN  --  3.2* 3.3* 3.3*   ALKPHOS  --  40 40 39*   ALT  --  16 18 22   AST  --  21 29 26   BILITOT  --  0.7 0.5 0.5       CARDIAC PROFILE LAST 3 DAYS  Recent Labs   Lab 07/13/24  1120   .3*   TROPONINI 0.027       ENDOCRINE LAST 3 DAYS  No results for input(s): "TSH", "PROCAL" in the last 168 " hours.    LAST ARTERIAL BLOOD GAS  ABG  Recent Labs   Lab 07/15/24  2221   PH 7.413   PO2 75*   PCO2 40.4   HCO3 25.8   BE 1       LAST 7 DAYS MICROBIOLOGY   Microbiology Results (last 7 days)       ** No results found for the last 168 hours. **            MOST RECENT IMAGING  CT Chest With Contrast  Narrative: EXAMINATION:  CT CHEST WITH CONTRAST    CLINICAL HISTORY:  Dyspnea, chronic, unclear etiology;, .    TECHNIQUE:  PATIENT RADIATION DOSE: DLP(mGycm) 156    As per PQRS measures, all CT scans at this facility used dose modulation, iterative reconstruction, and/or weight based dose adjustment when appropriate to reduce radiation dose to as low as reasonably achievable.    COMPARISON:  05/14/2024    FINDINGS:  Serial axial imaging of the chest with the administration of IV contrast.  Both mediastinal and pulmonary parenchymal windows were obtained.  Additional sagittal and coronal reconstructions were performed.Degenerative changes are noted to the thoracolumbar spine.  Compression deformities and vertebroplasty changes again evident at T12 T8 and T5.  There is heterogeneity of bone trabecula suggesting osteopenia.  Thyroid lobes are diminutive in size.  Atherosclerosis is seen within the aorta and branching vessels.  There is mild prominence of the ascending aorta measuring 3 point 5 cm in AP diameter.  A few mildly prominent lymph nodes seen within the precarinal space and subcarinal space.  No axillary lymphadenopathy is identified.  There is mucosal prominence at a small to moderate size hiatus hernia.  The heart is borderline enlarged.  The airways are grossly patent.  There is motion artifact.  Nodular wedge-shaped atelectasis and/or scarring has developed at the anterolateral right middle lobe.  Nodular atelectasis and scarring is again evident at the lateral lingular segment.  No effusion is seen.  Impression: 1. Interim development of nodular wedge-shaped atelectasis and/or scarring at the lateral right  middle lobe with persistent nodular atelectasis and or scarring again evident at the lingular segment  2. Mucosal prominence at a small to moderate size hiatus hernia  3. Borderline cardiomegaly  4. Findings and other details as above    Electronically signed by: Alin Sharif  Date:    07/16/2024  Time:    15:07      LASTECHO  No results found for this or any previous visit.      CURRENT/PREVIOUS VISIT EKG  Results for orders placed or performed during the hospital encounter of 07/15/24   EKG 12-lead    Collection Time: 07/15/24 10:39 AM   Result Value Ref Range    QRS Duration 70 ms    OHS QTC Calculation 453 ms    Narrative    Test Reason : R07.9,    Vent. Rate : 072 BPM     Atrial Rate : 072 BPM     P-R Int : 156 ms          QRS Dur : 070 ms      QT Int : 414 ms       P-R-T Axes : 068 038 032 degrees     QTc Int : 453 ms    Normal sinus rhythm  Normal ECG  When compared with ECG of 13-JUL-2024 10:51,  No significant change was found  Confirmed by Tru Santana MD ([id number]) on 7/15/2024 11:24:26 AM    Referred By: REJI BROWNLEE           Confirmed By:Tru Santana MD       ASSESSMENT/PLAN:     Active Hospital Problems    Diagnosis    *Exacerbation of asthma    Acquired hypothyroidism    Primary hypertension    Hypoxemia       ASSESSMENT & PLAN:   Patient with significant refractory lung pathology.  She does have some possible underlying COPD like symptoms/syndrome.  Continue steroids at a lower dose try to taper these down to oral the next couple days for transitioning to home.  She was getting better.  She has less respiratory distress and accessory muscle usage.  Continue breathing treatments and mucolytics.    Continue hypertension meds.  Next para chest x-ray in the morning.  F2 GI prophylaxis with famotidine DVT prophylaxis Lovenox.      RECOMMENDATIONS:  See above        Reji Brownlee III, MD  Department of Hospital Medicine ([facility])   Date of Service: 07/18/2024  10:25 PM

## 2024-07-19 NOTE — PT/OT/SLP PROGRESS
Physical Therapy Treatment    Patient Name:  Renata Escalona   MRN:  [id number]    Recommendations:     Discharge Recommendations: Moderate Intensity Therapy (SNF vs home health depending on progress)  Discharge Equipment Recommendations: walker, rolling  Barriers to discharge:  medical condition,     Assessment:     Renata Escalona is a 88 y.o. female admitted with a medical diagnosis of Exacerbation of asthma.  She presents with the following impairments/functional limitations: weakness, impaired endurance, impaired cognition, impaired self care skills, impaired functional mobility, gait instability, impaired balance, decreased safety awareness .    Rehab Prognosis: Good; patient would benefit from acute skilled PT services to address these deficits and reach maximum level of function.    Recent Surgery: * No surgery found *      Plan:     During this hospitalization, patient to be seen  (3-5x a week, 1-2x a day as needed) to address the identified rehab impairments via gait training, therapeutic activities, therapeutic exercises and progress toward the following goals:    Plan of Care Expires:       Subjective     Chief Complaint: shortness of breath  Patient/Family Comments/goals: no goals stated at this time. Patient's daughter, Adela, was present for session.  Pain/Comfort:  Pain Rating 1: 0/10      Objective:     Communicated with nurse prior to session.  Patient found up in chair with oxygen, telemetry, pulse ox (continuous) upon PT entry to room.     General Precautions: Standard, fall  Orthopedic Precautions: N/A  Braces: N/A  Respiratory Status: Nasal cannula, flow 1 L/min     Functional Mobility:  Transfers:     Sit to Stand:  contact guard assistance with rolling walker  Toilet Transfer: contact guard assistance with  rolling walker  using  Step Transfer  Gait: Pt ambulated ~30ft with RW and SBA on 1L oxygen, (SPO2 and HR monitored, SPO2 maintained to 92% and above - did exhibit tachycardia with HR elevating to  138 during gait but recovered to low 100s with extended seated rest - did exhibit some wheezing but much less than previous days. Patient is most limited by her tachycardia with mobility at this time but is on steroids (gait distance limited by tachycardia) - she does appear to be in good spirits and improving daily.  *Performed seated LE therex: marches with alternating reciprocal UE movement for several minutes, seated long arc quads (HR and SPO2 monitored - HR in low 120s with therex and SPO2 in 90s on 1L).  *Issued patient word searches papers to provide cognitive engagement outside of therapy    AM-PAC 6 CLICK MOBILITY          Treatment & Education:  Please see above. Patient is progressing slowly but steadily. Continues to exhibit endurance deficits but exhibits less SOB compared to previous day but continues to exhibit tachycardia with mobility. Exhibited good stability with all mobility. Discussed DC planning and patient's progress with patient's daughter, Adela. Adela states patient lives with her  and daughter is next door - states patient does not use assistive device at home but holds onto furniture/walls. Could benefit from RW use at home.    Patient left up in chair with all lines intact, call button in reach, chair alarm on, nurse notified, and daughter present..    GOALS:   Multidisciplinary Problems       Physical Therapy Goals          Problem: Physical Therapy    Goal Priority Disciplines Outcome Goal Variances Interventions   Physical Therapy Goal     PT, PT/OT Progressing     Description: Goals to be met by: discharge     Patient will increase functional independence with mobility by performin. Sit to stand transfer with Stand-by Assistance  2. Bed to chair transfer with Stand-by Assistance using Rolling Walker  3. Gait  x 75 feet with Stand-by Assistance using Rolling Walker.                          Time Tracking:     PT Received On: 24  PT Start Time: 1330     PT Stop Time:  1408  PT Total Time (min): 38 min     Billable Minutes: Gait Training 8, Therapeutic Activity 20, and Therapeutic Exercise 10    Treatment Type: Treatment  PT/PTA: PT           07/19/2024

## 2024-07-19 NOTE — PLAN OF CARE
Problem: Adult Inpatient Plan of Care  Goal: Plan of Care Review  Outcome: Progressing  Goal: Patient-Specific Goal (Individualized)  Outcome: Progressing  Goal: Absence of Hospital-Acquired Illness or Injury  Outcome: Progressing  Goal: Optimal Comfort and Wellbeing  Outcome: Progressing  Goal: Readiness for Transition of Care  Outcome: Progressing     Problem: Gas Exchange Impaired  Goal: Optimal Gas Exchange  Outcome: Progressing     Problem: Pain Acute  Goal: Optimal Pain Control and Function  Outcome: Progressing     Problem: Fall Injury Risk  Goal: Absence of Fall and Fall-Related Injury  Outcome: Progressing

## 2024-07-19 NOTE — PROGRESS NOTES
[facility]  [address] [place] [address]  Phone: [phone]    (Hospital) Internal Medicine  Progress Note      PATIENT NAME: Renata Escalona  MRN: [id number]  TODAY'S DATE: 07/19/2024  ADMIT DATE: 7/15/2024    SUBJECTIVE     PRINCIPLE PROBLEM: Exacerbation of asthma    INTERVAL HISTORY:    7/19/2024  Patient with mildly increased improvement overnight with her breathing.  She is still requiring 2-3 L nasal cannula oxygen.  She was no accessory muscle usage anymore.  See physical exam below.  She is eating she is tolerating foods no steroid psychosis.    Review of patient's allergies indicates:   Allergen Reactions    Ciprofloxacin Itching       ROS generalized weakness but otherwise doing well.  14 point review of systems negative otherwise.    OBJECTIVE     VITAL SIGNS (Most Recent)  Temp: 97.5 °F (36.4 °C) (07/19/24 1100)  Pulse: 102 (07/19/24 1100)  Resp: (!) 24 (07/19/24 1100)  BP: (!) 149/78 (07/19/24 1100)  SpO2: 98 % (07/19/24 1100)    VENTILATION STATUS  Resp: (!) 24 (07/19/24 1100)  SpO2: 98 % (07/19/24 1100)  Oxygen Concentration (%):  [24] 24        I & O (Last 24H):  Intake/Output Summary (Last 24 hours) at 7/19/2024 1447  Last data filed at 7/19/2024 1200  Gross per 24 hour   Intake 840 ml   Output --   Net 840 ml       WEIGHTS  Wt Readings from Last 3 Encounters:   07/17/24 1015 60.8 kg (134 lb)   07/13/24 1046 56.7 kg (125 lb)   06/27/24 0800 59 kg (130 lb)       Physical Exam    Patient was alert orient x3 she is putting a puzzle together with the daughter.  She was nasal cannula oxygen on 2-3 L.  Cranial nerves 2-12 are intact no JVD regular rate and rhythm no rubs gallops or murmurs grade 2 holosystolic murmur left 2nd intercostal space.  Abdomen is soft nontender nondistended no clubbing cyanosis or edema.    SCHEDULED MEDS:   albuterol-ipratropium  3 mL Nebulization Q4H    atorvastatin  10 mg Oral Daily    azithromycin  500 mg Intravenous Q24H    budesonide   "0.5 mg Nebulization Q12H    enoxaparin  40 mg Subcutaneous Daily    famotidine  20 mg Oral Daily    guaiFENesin  600 mg Oral BID    hydrocortisone   Rectal BID    isosorbide mononitrate  30 mg Oral Daily    losartan  50 mg Oral Daily    methylPREDNISolone injection (PEDS and ADULTS)  40 mg Intravenous Q8H       CONTINUOUS INFUSIONS:    PRN MEDS:  Current Facility-Administered Medications:     acetaminophen, 650 mg, Oral, Q8H PRN    ALPRAZolam, 0.25 mg, Oral, TID PRN    benzonatate, 100 mg, Oral, Q6H PRN    dextrose 10%, 12.5 g, Intravenous, PRN    dextrose 10%, 25 g, Intravenous, PRN    glucagon (human recombinant), 1 mg, Intramuscular, PRN    naloxone, 0.02 mg, Intravenous, PRN    ondansetron, 4 mg, Intravenous, Q4H PRN    sodium chloride 0.9%, 10 mL, Intravenous, Q12H PRN    LABS AND DIAGNOSTICS     CBC LAST 3 DAYS  Recent Labs   Lab 07/17/24 0411 07/18/24 0421 07/19/24  0434   WBC 16.35* 14.82* 12.66*   RBC 3.84* 3.78* 3.87*   HGB 11.9* 11.7* 11.8*   HCT 35.1* 35.2* 35.5*   MCV 91.4 93.1 91.7   MCH 31.0 31.0 30.5   MCHC 33.9 33.2 33.2   RDW 12.7 12.6 12.6    219 239   MPV 10.4 9.9 10.1       COAGULATION LAST 3 DAYS  No results for input(s): "LABPT", "INR", "APTT" in the last 168 hours.    CHEMISTRY LAST 3 DAYS  Recent Labs   Lab 07/15/24  2221 07/16/24  0515 07/17/24  0411 07/18/24  0421 07/19/24  0434   NA  --    < > 139 136 137   K  --    < > 3.7 3.7 3.7   CL  --    < > 106 104 103   CO2  --    < > 25 25 26   BUN  --    < > 12.0 12.0 11.0   CREATININE  --    < > 0.62 0.70 0.71   CALCIUM  --    < > 9.1 9.0 8.7   PH 7.413  --   --   --   --    MG  --   --   --   --  2.50   ALBUMIN  --    < > 3.3* 3.3* 3.2*   ALKPHOS  --    < > 40 39* 38*   ALT  --    < > 18 22 22   AST  --    < > 29 26 26   BILITOT  --    < > 0.5 0.5 0.5    < > = values in this interval not displayed.       CARDIAC PROFILE LAST 3 DAYS  Recent Labs   Lab 07/13/24  1120   .3*   TROPONINI 0.027       ENDOCRINE LAST 3 DAYS  No results " "for input(s): "TSH", "PROCAL" in the last 168 hours.    LAST ARTERIAL BLOOD GAS  ABG  Recent Labs   Lab 07/15/24  2221   PH 7.413   PO2 75*   PCO2 40.4   HCO3 25.8   BE 1       LAST 7 DAYS MICROBIOLOGY   Microbiology Results (last 7 days)       ** No results found for the last 168 hours. **            MOST RECENT IMAGING  CT Chest With Contrast  Narrative: EXAMINATION:  CT CHEST WITH CONTRAST    CLINICAL HISTORY:  Dyspnea, chronic, unclear etiology;, .    TECHNIQUE:  PATIENT RADIATION DOSE: DLP(mGycm) 156    As per PQRS measures, all CT scans at this facility used dose modulation, iterative reconstruction, and/or weight based dose adjustment when appropriate to reduce radiation dose to as low as reasonably achievable.    COMPARISON:  05/14/2024    FINDINGS:  Serial axial imaging of the chest with the administration of IV contrast.  Both mediastinal and pulmonary parenchymal windows were obtained.  Additional sagittal and coronal reconstructions were performed.Degenerative changes are noted to the thoracolumbar spine.  Compression deformities and vertebroplasty changes again evident at T12 T8 and T5.  There is heterogeneity of bone trabecula suggesting osteopenia.  Thyroid lobes are diminutive in size.  Atherosclerosis is seen within the aorta and branching vessels.  There is mild prominence of the ascending aorta measuring 3 point 5 cm in AP diameter.  A few mildly prominent lymph nodes seen within the precarinal space and subcarinal space.  No axillary lymphadenopathy is identified.  There is mucosal prominence at a small to moderate size hiatus hernia.  The heart is borderline enlarged.  The airways are grossly patent.  There is motion artifact.  Nodular wedge-shaped atelectasis and/or scarring has developed at the anterolateral right middle lobe.  Nodular atelectasis and scarring is again evident at the lateral lingular segment.  No effusion is seen.  Impression: 1. Interim development of nodular wedge-shaped " atelectasis and/or scarring at the lateral right middle lobe with persistent nodular atelectasis and or scarring again evident at the lingular segment  2. Mucosal prominence at a small to moderate size hiatus hernia  3. Borderline cardiomegaly  4. Findings and other details as above    Electronically signed by: Alin Sharif  Date:    07/16/2024  Time:    15:07      LASTECHO  No results found for this or any previous visit.      CURRENT/PREVIOUS VISIT EKG  Results for orders placed or performed during the hospital encounter of 07/15/24   EKG 12-lead    Collection Time: 07/15/24 10:39 AM   Result Value Ref Range    QRS Duration 70 ms    OHS QTC Calculation 453 ms    Narrative    Test Reason : R07.9,    Vent. Rate : 072 BPM     Atrial Rate : 072 BPM     P-R Int : 156 ms          QRS Dur : 070 ms      QT Int : 414 ms       P-R-T Axes : 068 038 032 degrees     QTc Int : 453 ms    Normal sinus rhythm  Normal ECG  When compared with ECG of 13-JUL-2024 10:51,  No significant change was found  Confirmed by Tru Santana MD ([id number]) on 7/15/2024 11:24:26 AM    Referred By: SUGAR BROWNLEE           Confirmed By:Tru Santana MD       ASSESSMENT/PLAN:     Active Hospital Problems    Diagnosis    *Exacerbation of asthma    Acquired hypothyroidism    Primary hypertension    Hypoxemia       ASSESSMENT & PLAN:   Will continue her current antibiotics and steroids.  I would like to taper her down to oral steroids over the weekend should her physical exam tolerated.  I explained to the daughter that she has a nebulous diagnosis of COPD like syndrome over the last year.  She is a never smoker but her lungs shows significant obstructive disease over the last 5 days that has improved slowly apically, middle, now the basal segments are starting to open up with steroids and breathing treatments and mucolytics.  I continue this through the weekend and then discuss getting her home on home oxygen Monday.  Family agrees  plan.      RECOMMENDATIONS:  DVT prophylaxis with Lovenox.  GI prophylaxis with famotidine        Reji Duckworth III, MD  Department of Hospital Medicine ([facility])   Date of Service: 07/19/2024  2:47 PM

## 2024-07-20 PROCEDURE — 25000003 PHARM REV CODE 250: Performed by: FAMILY MEDICINE

## 2024-07-20 PROCEDURE — 25000242 PHARM REV CODE 250 ALT 637 W/ HCPCS: Performed by: INTERNAL MEDICINE

## 2024-07-20 PROCEDURE — 99900035 HC TECH TIME PER 15 MIN (STAT)

## 2024-07-20 PROCEDURE — 27000221 HC OXYGEN, UP TO 24 HOURS

## 2024-07-20 PROCEDURE — 97110 THERAPEUTIC EXERCISES: CPT

## 2024-07-20 PROCEDURE — 94640 AIRWAY INHALATION TREATMENT: CPT

## 2024-07-20 PROCEDURE — 25000003 PHARM REV CODE 250: Performed by: INTERNAL MEDICINE

## 2024-07-20 PROCEDURE — 21400001 HC TELEMETRY ROOM

## 2024-07-20 PROCEDURE — 63600175 PHARM REV CODE 636 W HCPCS: Performed by: INTERNAL MEDICINE

## 2024-07-20 PROCEDURE — 94761 N-INVAS EAR/PLS OXIMETRY MLT: CPT

## 2024-07-20 PROCEDURE — 63600175 PHARM REV CODE 636 W HCPCS: Performed by: FAMILY MEDICINE

## 2024-07-20 PROCEDURE — 27000207 HC ISOLATION

## 2024-07-20 PROCEDURE — 97116 GAIT TRAINING THERAPY: CPT

## 2024-07-20 RX ORDER — CLONIDINE HYDROCHLORIDE 0.1 MG/1
0.1 TABLET ORAL EVERY 8 HOURS PRN
Status: DISCONTINUED | OUTPATIENT
Start: 2024-07-20 | End: 2024-07-22 | Stop reason: HOSPADM

## 2024-07-20 RX ORDER — LOSARTAN POTASSIUM 50 MG/1
50 TABLET ORAL DAILY
Status: DISCONTINUED | OUTPATIENT
Start: 2024-07-21 | End: 2024-07-21

## 2024-07-20 RX ORDER — LOSARTAN POTASSIUM 50 MG/1
100 TABLET ORAL DAILY
Status: DISCONTINUED | OUTPATIENT
Start: 2024-07-21 | End: 2024-07-20

## 2024-07-20 RX ORDER — PREDNISONE 20 MG/1
20 TABLET ORAL 2 TIMES DAILY
Status: DISCONTINUED | OUTPATIENT
Start: 2024-07-20 | End: 2024-07-22 | Stop reason: HOSPADM

## 2024-07-20 RX ADMIN — PREDNISONE 20 MG: 20 TABLET ORAL at 09:07

## 2024-07-20 RX ADMIN — CLONIDINE HYDROCHLORIDE 0.1 MG: 0.1 TABLET ORAL at 05:07

## 2024-07-20 RX ADMIN — ATORVASTATIN CALCIUM 10 MG: 10 TABLET, FILM COATED ORAL at 09:07

## 2024-07-20 RX ADMIN — LOSARTAN POTASSIUM 50 MG: 50 TABLET, FILM COATED ORAL at 09:07

## 2024-07-20 RX ADMIN — BUDESONIDE 0.5 MG: 0.5 INHALANT RESPIRATORY (INHALATION) at 07:07

## 2024-07-20 RX ADMIN — METHYLPREDNISOLONE SODIUM SUCCINATE 40 MG: 40 INJECTION, POWDER, FOR SOLUTION INTRAMUSCULAR; INTRAVENOUS at 06:07

## 2024-07-20 RX ADMIN — IPRATROPIUM BROMIDE AND ALBUTEROL SULFATE 3 ML: .5; 3 SOLUTION RESPIRATORY (INHALATION) at 03:07

## 2024-07-20 RX ADMIN — ENOXAPARIN SODIUM 40 MG: 40 INJECTION SUBCUTANEOUS at 05:07

## 2024-07-20 RX ADMIN — IPRATROPIUM BROMIDE AND ALBUTEROL SULFATE 3 ML: .5; 3 SOLUTION RESPIRATORY (INHALATION) at 07:07

## 2024-07-20 RX ADMIN — METHYLPREDNISOLONE SODIUM SUCCINATE 40 MG: 40 INJECTION, POWDER, FOR SOLUTION INTRAMUSCULAR; INTRAVENOUS at 02:07

## 2024-07-20 RX ADMIN — GUAIFENESIN 600 MG: 600 TABLET, EXTENDED RELEASE ORAL at 09:07

## 2024-07-20 RX ADMIN — FAMOTIDINE 20 MG: 20 TABLET ORAL at 09:07

## 2024-07-20 RX ADMIN — AZITHROMYCIN MONOHYDRATE 500 MG: 500 INJECTION, POWDER, LYOPHILIZED, FOR SOLUTION INTRAVENOUS at 09:07

## 2024-07-20 RX ADMIN — ISOSORBIDE MONONITRATE 30 MG: 30 TABLET, EXTENDED RELEASE ORAL at 09:07

## 2024-07-20 RX ADMIN — IPRATROPIUM BROMIDE AND ALBUTEROL SULFATE 3 ML: .5; 3 SOLUTION RESPIRATORY (INHALATION) at 04:07

## 2024-07-20 RX ADMIN — IPRATROPIUM BROMIDE AND ALBUTEROL SULFATE 3 ML: .5; 3 SOLUTION RESPIRATORY (INHALATION) at 11:07

## 2024-07-20 NOTE — PT/OT/SLP PROGRESS
"Physical Therapy Treatment    Patient Name:  Renata Escalona   MRN:  [id number]    Recommendations:     Discharge Recommendations: Moderate Intensity Therapy (SNF vs home health depending on progress)  Discharge Equipment Recommendations: walker, rolling  Barriers to discharge:  medical condition,     Assessment:     Renata Escalona is a 88 y.o. female admitted with a medical diagnosis of Exacerbation of asthma.  She presents with the following impairments/functional limitations: weakness, impaired endurance, impaired cognition, impaired self care skills, impaired functional mobility, gait instability, impaired balance, decreased safety awareness .    Rehab Prognosis: Good; patient would benefit from acute skilled PT services to address these deficits and reach maximum level of function.    Recent Surgery: * No surgery found *      Plan:     During this hospitalization, patient to be seen  (3-5x a week, 1-2x a day as needed) to address the identified rehab impairments via gait training, therapeutic activities, therapeutic exercises and progress toward the following goals:    Plan of Care Expires:       Subjective     Chief Complaint: no complaint.  Pt jokes "Lets walk and escape to home!" And laughs.  Patient/Family Comments/goals: Pt is aiming to return home. Patient's daughter, Adela, was present for session.  Pain/Comfort:         Objective:     Communicated with nurse prior to session.  Patient found up in chair with daughter present  upon PT entry to room.     General Precautions: Standard, fall  Orthopedic Precautions: N/A  Braces: N/A  Respiratory Status: Nasal cannula, flow 0.5 L/min humidified O2 (extension tubing utilized for walking but discontinued once pt back in chair)     Functional Mobility:  TF sit to stand CGA  Pt amb in her room 65ft on continuous Oxygen with RW for support and CGA for safety  TF to chair SBA  *Performed seated LE therex: marches, seated long arc quads, hip abduction, toe raises, heel " raises with O2 sats in the 90s and   AM-PAC 6 CLICK MOBILITY          Treatment & Education:  Please see above. Patient tolerated activity well today with no adverse effect.  Patient left up in chair with all lines intact, call button in reach, chair alarm on, nurse notified, and daughter present..    GOALS:   Multidisciplinary Problems       Physical Therapy Goals          Problem: Physical Therapy    Goal Priority Disciplines Outcome Goal Variances Interventions   Physical Therapy Goal     PT, PT/OT Progressing     Description: Goals to be met by: discharge     Patient will increase functional independence with mobility by performin. Sit to stand transfer with Stand-by Assistance  2. Bed to chair transfer with Stand-by Assistance using Rolling Walker  3. Gait  x 75 feet with Stand-by Assistance using Rolling Walker.                          Time Tracking:     PT Received On: 24  PT Start Time: 1110     PT Stop Time: 1140  PT Total Time (min): 30 min     Billable Minutes: Gait Training 16, Therapeutic Activity 4, and Therapeutic Exercise 10    Treatment Type: Treatment  PT/PTA: PT           2024

## 2024-07-20 NOTE — PROGRESS NOTES
Progress Note    Admit Date: 7/15/2024   LOS: 4 days     SUBJECTIVE:     Follow-up For:  Patient in the room with her daughter Nidia.  She was here COPD exacerbation antibiotic therapy.  She was still on 2 L of nasal cannula oxygen.  She was breathing comfortably.  She was eating  Scheduled Meds:   albuterol-ipratropium  3 mL Nebulization Q4H    atorvastatin  10 mg Oral Daily    azithromycin  500 mg Intravenous Q24H    budesonide  0.5 mg Nebulization Q12H    enoxaparin  40 mg Subcutaneous Daily    famotidine  20 mg Oral Daily    guaiFENesin  600 mg Oral BID    hydrocortisone   Rectal BID    isosorbide mononitrate  30 mg Oral Daily    losartan  50 mg Oral Daily    methylPREDNISolone injection (PEDS and ADULTS)  40 mg Intravenous Q8H     Continuous Infusions:  PRN Meds:  Current Facility-Administered Medications:     acetaminophen, 650 mg, Oral, Q8H PRN    ALPRAZolam, 0.25 mg, Oral, TID PRN    benzonatate, 100 mg, Oral, Q6H PRN    dextrose 10%, 12.5 g, Intravenous, PRN    dextrose 10%, 25 g, Intravenous, PRN    glucagon (human recombinant), 1 mg, Intramuscular, PRN    naloxone, 0.02 mg, Intravenous, PRN    ondansetron, 4 mg, Intravenous, Q4H PRN    sodium chloride 0.9%, 10 mL, Intravenous, Q12H PRN    Review of patient's allergies indicates:   Allergen Reactions    Ciprofloxacin Itching       Review of Systems  ROS    OBJECTIVE:     Vital Signs (Most Recent)  Temp: 97.5 °F (36.4 °C) (07/20/24 1557)  Pulse: 82 (07/20/24 1557)  Resp: 20 (07/20/24 1528)  BP: (!) 173/90 (07/20/24 1557)  SpO2: 97 % (07/20/24 1557)    Vital Signs Range (Last 24H):  Temp:  [97.3 °F (36.3 °C)-98.3 °F (36.8 °C)]   Pulse:  []   Resp:  [20-22]   BP: (138-180)/(72-90)   SpO2:  [93 %-97 %]     I & O (Last 24H):  Intake/Output Summary (Last 24 hours) at 7/20/2024 1606  Last data filed at 7/20/2024 1230  Gross per 24 hour   Intake 240 ml   Output --   Net 240 ml     Physical Exam:  Physical Exam   Vitals:    07/20/24 1557   BP: (!) 173/90    Pulse: 82   Resp:    Temp: 97.5 °F (36.4 °C)       Physical Exam   Constitutional: alert & oriented, no acute distress  HENT:   Head: Normocephalic and atraumatic.   Eyes: Conjunctivae normal and EOM are normal. Pupils are equal, round, and reactive to light.   Neck: Normal range of motion. Neck supple.   Cardiovascular: Normal rate, regular rhythm, normal heart sounds   Pulmonary/Chest:  Poor air movement with expiratory wheezing.  Abdominal: Soft, nontender, bowel sounds normal  Neurological: nonfocal  Skin: warm, dry intact  Psychiatric: normal mood and affect, cooperative          Laboratory:  No results found for this or any previous visit (from the past 24 hour(s)).     Diagnostic Results:  CT Chest With Contrast    Result Date: 7/16/2024  EXAMINATION: CT CHEST WITH CONTRAST CLINICAL HISTORY: Dyspnea, chronic, unclear etiology;, . TECHNIQUE: PATIENT RADIATION DOSE: DLP(mGycm) 156 As per PQRS measures, all CT scans at this facility used dose modulation, iterative reconstruction, and/or weight based dose adjustment when appropriate to reduce radiation dose to as low as reasonably achievable. COMPARISON: 05/14/2024 FINDINGS: Serial axial imaging of the chest with the administration of IV contrast.  Both mediastinal and pulmonary parenchymal windows were obtained.  Additional sagittal and coronal reconstructions were performed.Degenerative changes are noted to the thoracolumbar spine.  Compression deformities and vertebroplasty changes again evident at T12 T8 and T5.  There is heterogeneity of bone trabecula suggesting osteopenia.  Thyroid lobes are diminutive in size.  Atherosclerosis is seen within the aorta and branching vessels.  There is mild prominence of the ascending aorta measuring 3 point 5 cm in AP diameter.  A few mildly prominent lymph nodes seen within the precarinal space and subcarinal space.  No axillary lymphadenopathy is identified.  There is mucosal prominence at a small to moderate size  hiatus hernia.  The heart is borderline enlarged.  The airways are grossly patent.  There is motion artifact.  Nodular wedge-shaped atelectasis and/or scarring has developed at the anterolateral right middle lobe.  Nodular atelectasis and scarring is again evident at the lateral lingular segment.  No effusion is seen.     1. Interim development of nodular wedge-shaped atelectasis and/or scarring at the lateral right middle lobe with persistent nodular atelectasis and or scarring again evident at the lingular segment 2. Mucosal prominence at a small to moderate size hiatus hernia 3. Borderline cardiomegaly 4. Findings and other details as above Electronically signed by: Alin Sharif Date:    07/16/2024 Time:    15:07    X-Ray Chest 1 View    Result Date: 7/15/2024  EXAMINATION: XR CHEST 1 VIEW CLINICAL HISTORY: wheezing;, . COMPARISON: 07/13/2024 FINDINGS: An AP view or more reveals the heart to be borderline enlarged.  Atherosclerosis seen within a prominent aortic arch.  Atelectasis and/or scarring is evident the lower lungs.  Compression deformities and vertebroplasty changes are evident at the thoracic spine.  Bony structures are osteopenic.  The patient is rotated on the exam.     1. Borderline cardiomegaly 2. Atherosclerosis within a prominent thoracic aorta 3. Atelectasis and/or scarring lower lungs 4. Findings and other details as above Electronically signed by: Alin Sharif Date:    07/15/2024 Time:    13:48       ASSESSMENT/PLAN:       Plan:   Patient Active Problem List    Diagnosis Date Noted    Acquired hypothyroidism 07/16/2024    Primary hypertension 07/15/2024    Hypoxemia 07/15/2024    Exacerbation of asthma 08/15/2023    Severe persistent asthma 05/23/2023      Patient is improving.  I will taper down her steroids to oral.  We will add clonidine p.r.n. for her blood pressure.  She is doing well improving.  Able to taper down her steroids from IV to oral.  We will continue to monitor.  I have added  clonidine p.r.n. for her blood pressure.  DVT prophylaxis with enoxaparin 40, famotidine for GI prophylaxis  She would like [facility] for discharge

## 2024-07-21 LAB
ALBUMIN SERPL-MCNC: 3 G/DL (ref 3.4–4.8)
ALBUMIN/GLOB SERPL: 1.2 RATIO (ref 1.1–2)
ALP SERPL-CCNC: 38 UNIT/L (ref 40–150)
ALT SERPL-CCNC: 31 UNIT/L (ref 0–55)
ANION GAP SERPL CALC-SCNC: 7 MEQ/L
AST SERPL-CCNC: 25 UNIT/L (ref 5–34)
BASOPHILS # BLD AUTO: 0.05 X10(3)/MCL
BASOPHILS NFR BLD AUTO: 0.3 %
BILIRUB SERPL-MCNC: 0.7 MG/DL
BUN SERPL-MCNC: 12 MG/DL (ref 9.8–20.1)
CALCIUM SERPL-MCNC: 8.7 MG/DL (ref 8.4–10.2)
CHLORIDE SERPL-SCNC: 105 MMOL/L (ref 98–107)
CO2 SERPL-SCNC: 26 MMOL/L (ref 23–31)
CREAT SERPL-MCNC: 0.61 MG/DL (ref 0.55–1.02)
CREAT/UREA NIT SERPL: 20
EOSINOPHIL # BLD AUTO: 0 X10(3)/MCL (ref 0–0.9)
EOSINOPHIL NFR BLD AUTO: 0 %
ERYTHROCYTE [DISTWIDTH] IN BLOOD BY AUTOMATED COUNT: 12.6 % (ref 11.5–17)
GFR SERPLBLD CREATININE-BSD FMLA CKD-EPI: >60 ML/MIN/1.73/M2
GLOBULIN SER-MCNC: 2.6 GM/DL (ref 2.4–3.5)
GLUCOSE SERPL-MCNC: 99 MG/DL (ref 82–115)
HCT VFR BLD AUTO: 33.3 % (ref 37–47)
HGB BLD-MCNC: 11.2 G/DL (ref 12–16)
IMM GRANULOCYTES # BLD AUTO: 0.74 X10(3)/MCL (ref 0–0.04)
IMM GRANULOCYTES NFR BLD AUTO: 4.4 %
LYMPHOCYTES # BLD AUTO: 3.94 X10(3)/MCL (ref 0.6–4.6)
LYMPHOCYTES NFR BLD AUTO: 23.4 %
MCH RBC QN AUTO: 30.9 PG (ref 27–31)
MCHC RBC AUTO-ENTMCNC: 33.6 G/DL (ref 33–36)
MCV RBC AUTO: 91.7 FL (ref 80–94)
MONOCYTES # BLD AUTO: 1.45 X10(3)/MCL (ref 0.1–1.3)
MONOCYTES NFR BLD AUTO: 8.6 %
NEUTROPHILS # BLD AUTO: 10.66 X10(3)/MCL (ref 2.1–9.2)
NEUTROPHILS NFR BLD AUTO: 63.3 %
PLATELET # BLD AUTO: 220 X10(3)/MCL (ref 130–400)
PMV BLD AUTO: 9.6 FL (ref 7.4–10.4)
POTASSIUM SERPL-SCNC: 3.8 MMOL/L (ref 3.5–5.1)
PROT SERPL-MCNC: 5.6 GM/DL (ref 5.8–7.6)
RBC # BLD AUTO: 3.63 X10(6)/MCL (ref 4.2–5.4)
SODIUM SERPL-SCNC: 138 MMOL/L (ref 136–145)
WBC # BLD AUTO: 16.84 X10(3)/MCL (ref 4.5–11.5)

## 2024-07-21 PROCEDURE — 63700000 PHARM REV CODE 250 ALT 637 W/O HCPCS: Performed by: FAMILY MEDICINE

## 2024-07-21 PROCEDURE — 63600175 PHARM REV CODE 636 W HCPCS: Performed by: INTERNAL MEDICINE

## 2024-07-21 PROCEDURE — 27000221 HC OXYGEN, UP TO 24 HOURS

## 2024-07-21 PROCEDURE — 36415 COLL VENOUS BLD VENIPUNCTURE: CPT | Performed by: FAMILY MEDICINE

## 2024-07-21 PROCEDURE — 25000242 PHARM REV CODE 250 ALT 637 W/ HCPCS: Performed by: FAMILY MEDICINE

## 2024-07-21 PROCEDURE — 25000003 PHARM REV CODE 250: Performed by: FAMILY MEDICINE

## 2024-07-21 PROCEDURE — 99900035 HC TECH TIME PER 15 MIN (STAT)

## 2024-07-21 PROCEDURE — 21400001 HC TELEMETRY ROOM

## 2024-07-21 PROCEDURE — 27000207 HC ISOLATION

## 2024-07-21 PROCEDURE — 85025 COMPLETE CBC W/AUTO DIFF WBC: CPT | Performed by: FAMILY MEDICINE

## 2024-07-21 PROCEDURE — 25000003 PHARM REV CODE 250: Performed by: INTERNAL MEDICINE

## 2024-07-21 PROCEDURE — 25000242 PHARM REV CODE 250 ALT 637 W/ HCPCS: Performed by: INTERNAL MEDICINE

## 2024-07-21 PROCEDURE — 94761 N-INVAS EAR/PLS OXIMETRY MLT: CPT

## 2024-07-21 PROCEDURE — 63600175 PHARM REV CODE 636 W HCPCS: Performed by: FAMILY MEDICINE

## 2024-07-21 PROCEDURE — 94640 AIRWAY INHALATION TREATMENT: CPT

## 2024-07-21 PROCEDURE — 80053 COMPREHEN METABOLIC PANEL: CPT | Performed by: FAMILY MEDICINE

## 2024-07-21 RX ORDER — IPRATROPIUM BROMIDE AND ALBUTEROL SULFATE 2.5; .5 MG/3ML; MG/3ML
3 SOLUTION RESPIRATORY (INHALATION)
Status: DISCONTINUED | OUTPATIENT
Start: 2024-07-21 | End: 2024-07-22 | Stop reason: HOSPADM

## 2024-07-21 RX ORDER — LOSARTAN POTASSIUM 50 MG/1
100 TABLET ORAL DAILY
Status: DISCONTINUED | OUTPATIENT
Start: 2024-07-22 | End: 2024-07-22 | Stop reason: HOSPADM

## 2024-07-21 RX ORDER — LOSARTAN POTASSIUM 50 MG/1
50 TABLET ORAL DAILY
Status: COMPLETED | OUTPATIENT
Start: 2024-07-21 | End: 2024-07-21

## 2024-07-21 RX ORDER — AZITHROMYCIN 250 MG/1
500 TABLET, FILM COATED ORAL DAILY
Status: DISCONTINUED | OUTPATIENT
Start: 2024-07-21 | End: 2024-07-22 | Stop reason: HOSPADM

## 2024-07-21 RX ADMIN — PREDNISONE 20 MG: 20 TABLET ORAL at 08:07

## 2024-07-21 RX ADMIN — ENOXAPARIN SODIUM 40 MG: 40 INJECTION SUBCUTANEOUS at 05:07

## 2024-07-21 RX ADMIN — AZITHROMYCIN 500 MG: 250 TABLET, FILM COATED ORAL at 01:07

## 2024-07-21 RX ADMIN — IPRATROPIUM BROMIDE AND ALBUTEROL SULFATE 3 ML: .5; 3 SOLUTION RESPIRATORY (INHALATION) at 12:07

## 2024-07-21 RX ADMIN — LOSARTAN POTASSIUM 50 MG: 50 TABLET, FILM COATED ORAL at 11:07

## 2024-07-21 RX ADMIN — GUAIFENESIN 600 MG: 600 TABLET, EXTENDED RELEASE ORAL at 08:07

## 2024-07-21 RX ADMIN — GUAIFENESIN 600 MG: 600 TABLET, EXTENDED RELEASE ORAL at 09:07

## 2024-07-21 RX ADMIN — ATORVASTATIN CALCIUM 10 MG: 10 TABLET, FILM COATED ORAL at 09:07

## 2024-07-21 RX ADMIN — BUDESONIDE 0.5 MG: 0.5 INHALANT RESPIRATORY (INHALATION) at 07:07

## 2024-07-21 RX ADMIN — FAMOTIDINE 20 MG: 20 TABLET ORAL at 09:07

## 2024-07-21 RX ADMIN — IPRATROPIUM BROMIDE AND ALBUTEROL SULFATE 3 ML: .5; 3 SOLUTION RESPIRATORY (INHALATION) at 01:07

## 2024-07-21 RX ADMIN — LOSARTAN POTASSIUM 50 MG: 50 TABLET, FILM COATED ORAL at 09:07

## 2024-07-21 RX ADMIN — PREDNISONE 20 MG: 20 TABLET ORAL at 09:07

## 2024-07-21 RX ADMIN — HYDROCORTISONE 2.5%: 25 CREAM TOPICAL at 01:07

## 2024-07-21 RX ADMIN — ISOSORBIDE MONONITRATE 30 MG: 30 TABLET, EXTENDED RELEASE ORAL at 09:07

## 2024-07-21 RX ADMIN — IPRATROPIUM BROMIDE AND ALBUTEROL SULFATE 3 ML: .5; 3 SOLUTION RESPIRATORY (INHALATION) at 07:07

## 2024-07-21 NOTE — PROGRESS NOTES
Progress Note    Admit Date: 7/15/2024   LOS: 5 days     SUBJECTIVE:     Follow-up For:  Patient in the room with her daughter Nidia.  She was here COPD exacerbation antibiotic therapy.  She was still on 2 L of nasal cannula oxygen.  She was breathing comfortably.  She was eating  Scheduled Meds:   albuterol-ipratropium  3 mL Nebulization TID WAKE    atorvastatin  10 mg Oral Daily    azithromycin  500 mg Intravenous Q24H    budesonide  0.5 mg Nebulization Q12H    enoxaparin  40 mg Subcutaneous Daily    famotidine  20 mg Oral Daily    guaiFENesin  600 mg Oral BID    hydrocortisone   Rectal BID    isosorbide mononitrate  30 mg Oral Daily    [START ON 7/22/2024] losartan  100 mg Oral Daily    losartan  50 mg Oral Daily    predniSONE  20 mg Oral BID     Continuous Infusions:  PRN Meds:  Current Facility-Administered Medications:     acetaminophen, 650 mg, Oral, Q8H PRN    ALPRAZolam, 0.25 mg, Oral, TID PRN    benzonatate, 100 mg, Oral, Q6H PRN    cloNIDine, 0.1 mg, Oral, Q8H PRN    dextrose 10%, 12.5 g, Intravenous, PRN    dextrose 10%, 25 g, Intravenous, PRN    glucagon (human recombinant), 1 mg, Intramuscular, PRN    naloxone, 0.02 mg, Intravenous, PRN    ondansetron, 4 mg, Intravenous, Q4H PRN    sodium chloride 0.9%, 10 mL, Intravenous, Q12H PRN    Review of patient's allergies indicates:   Allergen Reactions    Ciprofloxacin Itching       Review of Systems  ROS    OBJECTIVE:     Vital Signs (Most Recent)  Temp: 97.6 °F (36.4 °C) (07/21/24 0811)  Pulse: 75 (07/21/24 0811)  Resp: 20 (07/21/24 0721)  BP: (!) 162/76 (07/21/24 0811)  SpO2: (!) 93 % (07/21/24 0811)    Vital Signs Range (Last 24H):  Temp:  [97.5 °F (36.4 °C)-98.1 °F (36.7 °C)]   Pulse:  [72-90]   Resp:  [16-20]   BP: (131-173)/(71-90)   SpO2:  [93 %-97 %]     I & O (Last 24H):  Intake/Output Summary (Last 24 hours) at 7/21/2024 1105  Last data filed at 7/21/2024 0954  Gross per 24 hour   Intake 1191.98 ml   Output --   Net 1191.98 ml     Physical  Exam:  Physical Exam   Vitals:    07/21/24 0811   BP: (!) 162/76   Pulse: 75   Resp:    Temp: 97.6 °F (36.4 °C)       Physical Exam   Constitutional: alert & oriented, no acute distress  HENT:   Head: Normocephalic and atraumatic.   Eyes: Conjunctivae normal and EOM are normal. Pupils are equal, round, and reactive to light.   Neck: Normal range of motion. Neck supple.   Cardiovascular: Normal rate, regular rhythm, normal heart sounds   Pulmonary/Chest:  Poor air movement with expiratory wheezing.  Abdominal: Soft, nontender, bowel sounds normal  Neurological: nonfocal  Skin: warm, dry intact  Psychiatric: normal mood and affect, cooperative          Laboratory:  Recent Results (from the past 24 hour(s))   Comprehensive Metabolic Panel    Collection Time: 07/21/24  8:06 AM   Result Value Ref Range    Sodium 138 136 - 145 mmol/L    Potassium 3.8 3.5 - 5.1 mmol/L    Chloride 105 98 - 107 mmol/L    CO2 26 23 - 31 mmol/L    Glucose 99 82 - 115 mg/dL    Blood Urea Nitrogen 12.0 9.8 - 20.1 mg/dL    Creatinine 0.61 0.55 - 1.02 mg/dL    Calcium 8.7 8.4 - 10.2 mg/dL    Protein Total 5.6 (L) 5.8 - 7.6 gm/dL    Albumin 3.0 (L) 3.4 - 4.8 g/dL    Globulin 2.6 2.4 - 3.5 gm/dL    Albumin/Globulin Ratio 1.2 1.1 - 2.0 ratio    Bilirubin Total 0.7 <=1.5 mg/dL    ALP 38 (L) 40 - 150 unit/L    ALT 31 0 - 55 unit/L    AST 25 5 - 34 unit/L    eGFR >60 mL/min/1.73/m2    Anion Gap 7.0 mEq/L    BUN/Creatinine Ratio 20    CBC with Differential    Collection Time: 07/21/24  8:06 AM   Result Value Ref Range    WBC 16.84 (H) 4.50 - 11.50 x10(3)/mcL    RBC 3.63 (L) 4.20 - 5.40 x10(6)/mcL    Hgb 11.2 (L) 12.0 - 16.0 g/dL    Hct 33.3 (L) 37.0 - 47.0 %    MCV 91.7 80.0 - 94.0 fL    MCH 30.9 27.0 - 31.0 pg    MCHC 33.6 33.0 - 36.0 g/dL    RDW 12.6 11.5 - 17.0 %    Platelet 220 130 - 400 x10(3)/mcL    MPV 9.6 7.4 - 10.4 fL    Neut % 63.3 %    Lymph % 23.4 %    Mono % 8.6 %    Eos % 0.0 %    Basophil % 0.3 %    Lymph # 3.94 0.6 - 4.6 x10(3)/mcL     Neut # 10.66 (H) 2.1 - 9.2 x10(3)/mcL    Mono # 1.45 (H) 0.1 - 1.3 x10(3)/mcL    Eos # 0.00 0 - 0.9 x10(3)/mcL    Baso # 0.05 <=0.2 x10(3)/mcL    IG# 0.74 (H) 0 - 0.04 x10(3)/mcL    IG% 4.4 %        Diagnostic Results:  CT Chest With Contrast    Result Date: 7/16/2024  EXAMINATION: CT CHEST WITH CONTRAST CLINICAL HISTORY: Dyspnea, chronic, unclear etiology;, . TECHNIQUE: PATIENT RADIATION DOSE: DLP(mGycm) 156 As per PQRS measures, all CT scans at this facility used dose modulation, iterative reconstruction, and/or weight based dose adjustment when appropriate to reduce radiation dose to as low as reasonably achievable. COMPARISON: 05/14/2024 FINDINGS: Serial axial imaging of the chest with the administration of IV contrast.  Both mediastinal and pulmonary parenchymal windows were obtained.  Additional sagittal and coronal reconstructions were performed.Degenerative changes are noted to the thoracolumbar spine.  Compression deformities and vertebroplasty changes again evident at T12 T8 and T5.  There is heterogeneity of bone trabecula suggesting osteopenia.  Thyroid lobes are diminutive in size.  Atherosclerosis is seen within the aorta and branching vessels.  There is mild prominence of the ascending aorta measuring 3 point 5 cm in AP diameter.  A few mildly prominent lymph nodes seen within the precarinal space and subcarinal space.  No axillary lymphadenopathy is identified.  There is mucosal prominence at a small to moderate size hiatus hernia.  The heart is borderline enlarged.  The airways are grossly patent.  There is motion artifact.  Nodular wedge-shaped atelectasis and/or scarring has developed at the anterolateral right middle lobe.  Nodular atelectasis and scarring is again evident at the lateral lingular segment.  No effusion is seen.     1. Interim development of nodular wedge-shaped atelectasis and/or scarring at the lateral right middle lobe with persistent nodular atelectasis and or scarring again  evident at the lingular segment 2. Mucosal prominence at a small to moderate size hiatus hernia 3. Borderline cardiomegaly 4. Findings and other details as above Electronically signed by: Alin Sharif Date:    07/16/2024 Time:    15:07    X-Ray Chest 1 View    Result Date: 7/15/2024  EXAMINATION: XR CHEST 1 VIEW CLINICAL HISTORY: wheezing;, . COMPARISON: 07/13/2024 FINDINGS: An AP view or more reveals the heart to be borderline enlarged.  Atherosclerosis seen within a prominent aortic arch.  Atelectasis and/or scarring is evident the lower lungs.  Compression deformities and vertebroplasty changes are evident at the thoracic spine.  Bony structures are osteopenic.  The patient is rotated on the exam.     1. Borderline cardiomegaly 2. Atherosclerosis within a prominent thoracic aorta 3. Atelectasis and/or scarring lower lungs 4. Findings and other details as above Electronically signed by: Alin Sharif Date:    07/15/2024 Time:    13:48       ASSESSMENT/PLAN:       Plan:   Patient Active Problem List    Diagnosis Date Noted    Acquired hypothyroidism 07/16/2024    Primary hypertension 07/15/2024    Hypoxemia 07/15/2024    Exacerbation of asthma 08/15/2023    Severe persistent asthma 05/23/2023      Patient is improving.  I will taper down her steroids to oral.  We will add clonidine p.r.n. for her blood pressure.  She is doing well improving.  Able to taper down her steroids from IV to oral.  We will continue to monitor.  I have added clonidine p.r.n. for her blood pressure.  DVT prophylaxis with enoxaparin 40, famotidine for GI prophylaxis  She would like [facility] for discharge    7/21   I did decrease stopped her IV steroids and change her to prednisone 20 b.i.d., I did anticipate that this may improve her blood pressure.  However, her blood pressure still elevated.  I will give her another dose of losartan 51 p.o. today extra to a quite 2 losartan 100.  I will increase her losartan dose to 100 for  tomorrow.  I did give her clonidine p.r.n. yesterday.  The patient's family is very concerned about her blood pressure.  I anticipate according to after 1st plan that she may go home tomorrow the patient's family would like a [facility] home health for discharge.  She will probably also need oxygen for discharge tomorrow at least temporarily because her O2 sat is remaining somewhat low.  This is not her baseline and she does not have oxygen at home at this time.

## 2024-07-22 VITALS
WEIGHT: 134 LBS | TEMPERATURE: 98 F | RESPIRATION RATE: 20 BRPM | HEART RATE: 79 BPM | DIASTOLIC BLOOD PRESSURE: 83 MMHG | BODY MASS INDEX: 24.66 KG/M2 | OXYGEN SATURATION: 97 % | HEIGHT: 62 IN | SYSTOLIC BLOOD PRESSURE: 171 MMHG

## 2024-07-22 PROBLEM — J45.901 EXACERBATION OF ASTHMA: Status: RESOLVED | Noted: 2023-08-15 | Resolved: 2024-07-22

## 2024-07-22 LAB
ALBUMIN SERPL-MCNC: 2.8 G/DL (ref 3.4–4.8)
ALBUMIN/GLOB SERPL: 1.1 RATIO (ref 1.1–2)
ALP SERPL-CCNC: 39 UNIT/L (ref 40–150)
ALT SERPL-CCNC: 34 UNIT/L (ref 0–55)
ANION GAP SERPL CALC-SCNC: 6 MEQ/L
AST SERPL-CCNC: 24 UNIT/L (ref 5–34)
BASOPHILS # BLD AUTO: 0.06 X10(3)/MCL
BASOPHILS NFR BLD AUTO: 0.4 %
BILIRUB SERPL-MCNC: 0.6 MG/DL
BUN SERPL-MCNC: 16 MG/DL (ref 9.8–20.1)
CALCIUM SERPL-MCNC: 8.4 MG/DL (ref 8.4–10.2)
CHLORIDE SERPL-SCNC: 104 MMOL/L (ref 98–107)
CO2 SERPL-SCNC: 26 MMOL/L (ref 23–31)
CREAT SERPL-MCNC: 0.61 MG/DL (ref 0.55–1.02)
CREAT/UREA NIT SERPL: 26
EOSINOPHIL # BLD AUTO: 0 X10(3)/MCL (ref 0–0.9)
EOSINOPHIL NFR BLD AUTO: 0 %
ERYTHROCYTE [DISTWIDTH] IN BLOOD BY AUTOMATED COUNT: 12.9 % (ref 11.5–17)
GFR SERPLBLD CREATININE-BSD FMLA CKD-EPI: >60 ML/MIN/1.73/M2
GLOBULIN SER-MCNC: 2.5 GM/DL (ref 2.4–3.5)
GLUCOSE SERPL-MCNC: 98 MG/DL (ref 82–115)
HCT VFR BLD AUTO: 34.4 % (ref 37–47)
HGB BLD-MCNC: 11.6 G/DL (ref 12–16)
IMM GRANULOCYTES # BLD AUTO: 0.81 X10(3)/MCL (ref 0–0.04)
IMM GRANULOCYTES NFR BLD AUTO: 4.8 %
LYMPHOCYTES # BLD AUTO: 3.37 X10(3)/MCL (ref 0.6–4.6)
LYMPHOCYTES NFR BLD AUTO: 19.8 %
MAGNESIUM SERPL-MCNC: 2.3 MG/DL (ref 1.6–2.6)
MCH RBC QN AUTO: 30.8 PG (ref 27–31)
MCHC RBC AUTO-ENTMCNC: 33.7 G/DL (ref 33–36)
MCV RBC AUTO: 91.2 FL (ref 80–94)
MONOCYTES # BLD AUTO: 1.09 X10(3)/MCL (ref 0.1–1.3)
MONOCYTES NFR BLD AUTO: 6.4 %
NEUTROPHILS # BLD AUTO: 11.67 X10(3)/MCL (ref 2.1–9.2)
NEUTROPHILS NFR BLD AUTO: 68.6 %
PHOSPHATE SERPL-MCNC: 2.4 MG/DL (ref 2.3–4.7)
PLATELET # BLD AUTO: 227 X10(3)/MCL (ref 130–400)
PMV BLD AUTO: 9.2 FL (ref 7.4–10.4)
POTASSIUM SERPL-SCNC: 4.2 MMOL/L (ref 3.5–5.1)
PROT SERPL-MCNC: 5.3 GM/DL (ref 5.8–7.6)
RBC # BLD AUTO: 3.77 X10(6)/MCL (ref 4.2–5.4)
SODIUM SERPL-SCNC: 136 MMOL/L (ref 136–145)
WBC # BLD AUTO: 17 X10(3)/MCL (ref 4.5–11.5)

## 2024-07-22 PROCEDURE — 83735 ASSAY OF MAGNESIUM: CPT | Performed by: FAMILY MEDICINE

## 2024-07-22 PROCEDURE — 27000221 HC OXYGEN, UP TO 24 HOURS

## 2024-07-22 PROCEDURE — 63700000 PHARM REV CODE 250 ALT 637 W/O HCPCS: Performed by: FAMILY MEDICINE

## 2024-07-22 PROCEDURE — 36415 COLL VENOUS BLD VENIPUNCTURE: CPT | Performed by: FAMILY MEDICINE

## 2024-07-22 PROCEDURE — 25000003 PHARM REV CODE 250: Performed by: FAMILY MEDICINE

## 2024-07-22 PROCEDURE — 85025 COMPLETE CBC W/AUTO DIFF WBC: CPT | Performed by: FAMILY MEDICINE

## 2024-07-22 PROCEDURE — 84100 ASSAY OF PHOSPHORUS: CPT | Performed by: FAMILY MEDICINE

## 2024-07-22 PROCEDURE — 63600175 PHARM REV CODE 636 W HCPCS: Performed by: FAMILY MEDICINE

## 2024-07-22 PROCEDURE — 25000003 PHARM REV CODE 250: Performed by: INTERNAL MEDICINE

## 2024-07-22 PROCEDURE — 25000242 PHARM REV CODE 250 ALT 637 W/ HCPCS: Performed by: FAMILY MEDICINE

## 2024-07-22 PROCEDURE — 94761 N-INVAS EAR/PLS OXIMETRY MLT: CPT

## 2024-07-22 PROCEDURE — 80053 COMPREHEN METABOLIC PANEL: CPT | Performed by: FAMILY MEDICINE

## 2024-07-22 PROCEDURE — 94640 AIRWAY INHALATION TREATMENT: CPT

## 2024-07-22 PROCEDURE — 25000242 PHARM REV CODE 250 ALT 637 W/ HCPCS: Performed by: INTERNAL MEDICINE

## 2024-07-22 RX ORDER — PREDNISONE 20 MG/1
TABLET ORAL
Qty: 7 TABLET | Refills: 0 | Status: SHIPPED | OUTPATIENT
Start: 2024-07-22 | End: 2024-07-28

## 2024-07-22 RX ORDER — LOSARTAN POTASSIUM 100 MG/1
100 TABLET ORAL DAILY
Qty: 90 TABLET | Refills: 3 | Status: SHIPPED | OUTPATIENT
Start: 2024-07-22 | End: 2025-07-22

## 2024-07-22 RX ORDER — AZITHROMYCIN 500 MG/1
500 TABLET, FILM COATED ORAL DAILY
Qty: 3 EACH | Refills: 0 | Status: SHIPPED | OUTPATIENT
Start: 2024-07-23 | End: 2024-07-26

## 2024-07-22 RX ORDER — GUAIFENESIN 600 MG/1
600 TABLET, EXTENDED RELEASE ORAL 2 TIMES DAILY
Qty: 15 TABLET | Refills: 0 | Status: SHIPPED | OUTPATIENT
Start: 2024-07-22

## 2024-07-22 RX ADMIN — AZITHROMYCIN 500 MG: 250 TABLET, FILM COATED ORAL at 09:07

## 2024-07-22 RX ADMIN — PREDNISONE 20 MG: 20 TABLET ORAL at 09:07

## 2024-07-22 RX ADMIN — IPRATROPIUM BROMIDE AND ALBUTEROL SULFATE 3 ML: .5; 3 SOLUTION RESPIRATORY (INHALATION) at 06:07

## 2024-07-22 RX ADMIN — LOSARTAN POTASSIUM 100 MG: 50 TABLET, FILM COATED ORAL at 09:07

## 2024-07-22 RX ADMIN — BUDESONIDE 0.5 MG: 0.5 INHALANT RESPIRATORY (INHALATION) at 06:07

## 2024-07-22 RX ADMIN — ISOSORBIDE MONONITRATE 30 MG: 30 TABLET, EXTENDED RELEASE ORAL at 09:07

## 2024-07-22 RX ADMIN — GUAIFENESIN 600 MG: 600 TABLET, EXTENDED RELEASE ORAL at 09:07

## 2024-07-22 RX ADMIN — FAMOTIDINE 20 MG: 20 TABLET ORAL at 09:07

## 2024-07-22 RX ADMIN — ATORVASTATIN CALCIUM 10 MG: 10 TABLET, FILM COATED ORAL at 09:07

## 2024-07-22 NOTE — NURSING
Pt to be discharge to home with family times one    Discharge instructions reviewed with understanding     PT noted to be in stable condition with no concerns noted or voiced    PT to be discharge on O2 and waiting for delivery of Oxygen before discharge to home

## 2024-07-22 NOTE — PLAN OF CARE
Patient on O2 at 2L/M nasal cannula continuously.  Nurse removed oxygen from patient and after 3 minutes, while patient was sitting at rest, nurse checked RA O2 sat and was 88%. Nurse replaced oxygen back on patient and O2 sat came back up to 95%.

## 2024-07-22 NOTE — DISCHARGE INSTRUCTIONS
[facility] will deliver your oxygen to your room for d/c home today. Phone is [phone].

## 2024-07-22 NOTE — PLAN OF CARE
Problem: Adult Inpatient Plan of Care  Goal: Plan of Care Review  Outcome: Progressing  Goal: Patient-Specific Goal (Individualized)  Outcome: Progressing  Goal: Absence of Hospital-Acquired Illness or Injury  Outcome: Progressing  Goal: Optimal Comfort and Wellbeing  Outcome: Progressing  Goal: Readiness for Transition of Care  Outcome: Progressing     Problem: Gas Exchange Impaired  Goal: Optimal Gas Exchange  Outcome: Progressing     Problem: Pain Acute  Goal: Optimal Pain Control and Function  Outcome: Progressing     Problem: Fall Injury Risk  Goal: Absence of Fall and Fall-Related Injury  Outcome: Progressing     Problem: Comorbidity Management  Goal: Maintenance of Asthma Control  Outcome: Progressing  Goal: Maintenance of COPD Symptom Control  Outcome: Progressing  Goal: Blood Pressure in Desired Range  Outcome: Progressing     Problem: Comorbidity Management  Goal: Maintenance of Asthma Control  Outcome: Progressing  Goal: Maintenance of COPD Symptom Control  Outcome: Progressing  Goal: Blood Pressure in Desired Range  Outcome: Progressing     Problem: Hypertension Acute  Goal: Blood Pressure Within Desired Range  Outcome: Progressing     Problem: Functional Deficit  Goal: Improved Balance and Postural Control  Outcome: Progressing  Goal: Optimal Cognitive Function  Outcome: Progressing  Goal: Optimal Coordination  Outcome: Progressing  Goal: Improved Muscle Strength  Outcome: Progressing  Goal: Improved Muscle Tone  Outcome: Progressing  Goal: Optimal Range of Motion  Outcome: Progressing  Goal: Compensation for Sensory Deficit  Outcome: Progressing     Problem: Functional Deficit  Goal: Compensation for Sensory Deficit  Outcome: Progressing     Problem: Infection  Goal: Absence of Infection Signs and Symptoms  Outcome: Progressing

## 2024-07-22 NOTE — PLAN OF CARE
07/22/24 0907   Final Note   Assessment Type Final Discharge Note   Anticipated Discharge Disposition Home-Health   Post-Acute Status   Post-Acute Authorization HME;Home Health   HME Status Set-up Complete/Auth obtained   Home Health Status Set-up Complete/Auth obtained   Discharge Delays None known at this time     D/c home once [employer] delivers portable O2.

## 2024-07-23 ENCOUNTER — PATIENT OUTREACH (OUTPATIENT)
Dept: ADMINISTRATIVE | Facility: CLINIC | Age: 89
End: 2024-07-23
Payer: MEDICARE

## 2024-07-23 NOTE — HOSPITAL COURSE
Patient was an 88-year-old white female, follow up by the Pulmonary Service in [place] at [facility] with suspected adult asthma treated with Fasenra monthly.  Patient reported to the emergency room from my office this past week because of extreme shortness of breath.  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  She was placed on initial steroids azithromycin and Rocephin for urine that was thought to come back positive.  It did not.  Rocephin was stopped.  She was continued for please trophic effects of azithromycin while she was here in addition to steroids.  She was on IV steroids 80 Q 8 hours for a couple of days and then taper down to 40 and then over the weekend my partner Dr. Medrano transitioned her down to oral medications.    Clinically she appeared to do better she was out without Respiratory distress but her exam is slow to improve.  She did show some improvement clinically on that examination basis throughout last week but she is kind of just staggering and not showing significant signs of improvement in the lower lung zones.  Of note, CT was obtained early last week that ruled out any sort of an embolic/pulmonary embolic phenomenon.    The patient started to feel well over the weekend and she was able to participate with physical therapy.  She is still requiring about 2 L nasal cannula oxygen which will get her as we transitioned home.  Ultimately I think we can be doing the same treatments for at home now with oxygen and oral steroids as we could in the hospital here.  Think she will also require and benefit from home health which were arranging through [facility].      Will continue on 3 additional days of azithromycin p.o. for polio trophic effects.    I will also try to, as an outpatient basis, translate my concerns to pulmonary service.  Patient was not seem to improve very much with IV steroids appropriate therapies here.  I will get their input with regards to continued  send her treatment versus something more aggressive and if there is no alternative therapies have more palliative approach with this patient which I think will be likely eminent considering her age/frailty and disease process/trajectory.

## 2024-07-23 NOTE — ASSESSMENT & PLAN NOTE
Chronic, continue BP meds. Latest blood pressure and vitals reviewed-     Temp:  [97.4 °F (36.3 °C)-97.6 °F (36.4 °C)]   Pulse:  [75-79]   Resp:  [18-20]   BP: (135-171)/(69-84)   SpO2:  [95 %-98 %] .   Home meds for hypertension were reviewed and noted below.   Hypertension Medications               bisoprolol (ZEBETA) 5 MG tablet Take 5 mg by mouth.    isosorbide mononitrate (IMDUR) 30 MG 24 hr tablet Take 30 mg by mouth every morning.    losartan (COZAAR) 50 MG tablet Take 50 mg by mouth.            While in the hospital, will manage blood pressure as follows; Continue home antihypertensive regimen    Will utilize p.r.n. blood pressure medication only if patient's blood pressure greater than 180/110 and she develops symptoms such as worsening chest pain or shortness of breath.

## 2024-07-23 NOTE — PROGRESS NOTES
C3 nurse attempted to contact Renata Escalona  for a TCC post hospital discharge follow up call. No answer. Left voicemail with callback information. The patient has a scheduled HOSFU appointment with Reji Duckworth III, MD (Internal Medicine) on 8/7/2024 @1:45pm

## 2024-07-23 NOTE — PROGRESS NOTES
C3 nurse attempted to return Renata Escalona daughter Nidia call for a TCC post hospital discharge follow up call. Pt daughter unavailable at this time. The patient has a scheduled HOSFU appointment with Reji Duckworth III, MD (Internal Medicine) on 8/7/2024 @1:45pm.

## 2024-07-23 NOTE — DISCHARGE SUMMARY
[facility] - Medical Surgical Unit  Hospital Medicine  Discharge Summary      Patient Name: Renata Escalona  MRN: [id number]  JIMENEZ: [id number]  Patient Class: IP- Inpatient  Admission Date: 7/15/2024  Hospital Length of Stay: 6 days  Discharge Date and Time: 7/22/2024 11:15 AM  Attending Physician: No att. providers found   Discharging Provider: Reji Duckworth III, MD  Primary Care Provider: Reji Duckworth III, MD    Primary Care Team: Networked reference to record PCT     HPI:   88-year-old white female with a history of developing lung disease primarily in her 80s.  She is a never smoker and has a nebulous diagnosis of COPD.  Patient reports a cough over the last several days that was incessant and she reports to the office this morning saw my partner Dr. Ross who directly admitted her because of hypoxemia and shortness of breaths.  Family states the patient has a cough and she is usually short of breath couple times a day and then it goes away.  Comes and goes.  She does not have oxygen at home.  Right now she was admitted to the hospital under the presumption of the COPD exacerbation.  Initial workup has been negative and we are putting her here for neb treatments and further workup with regards to the etiology for shortness of breaths.    * No surgery found *      Hospital Course:   Patient was an 88-year-old white female, follow up by the Pulmonary Service in [place] at [facility] with suspected adult asthma treated with Fasenra monthly.  Patient reported to the emergency room from my office this past week because of extreme shortness of breath.  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  She was placed on initial steroids azithromycin and Rocephin for urine that was thought to come back positive.  It did not.  Rocephin was stopped.  She was continued for please trophic effects of azithromycin while she was here in addition to steroids.  She was on IV steroids  80 Q 8 hours for a couple of days and then taper down to 40 and then over the weekend my partner Dr. Medrano transitioned her down to oral medications.    Clinically she appeared to do better she was out without Respiratory distress but her exam is slow to improve.  She did show some improvement clinically on that examination basis throughout last week but she is kind of just staggering and not showing significant signs of improvement in the lower lung zones.  Of note, CT was obtained early last week that ruled out any sort of an embolic/pulmonary embolic phenomenon.    The patient started to feel well over the weekend and she was able to participate with physical therapy.  She is still requiring about 2 L nasal cannula oxygen which will get her as we transitioned home.  Ultimately I think we can be doing the same treatments for at home now with oxygen and oral steroids as we could in the hospital here.  Think she will also require and benefit from home health which were arranging through [facility].      Will continue on 3 additional days of azithromycin p.o. for polio trophic effects.    I will also try to, as an outpatient basis, translate my concerns to pulmonary service.  Patient was not seem to improve very much with IV steroids appropriate therapies here.  I will get their input with regards to continued send her treatment versus something more aggressive and if there is no alternative therapies have more palliative approach with this patient which I think will be likely eminent considering her age/frailty and disease process/trajectory.     Goals of Care Treatment Preferences:  Code Status: Full Code      Consults:     Pulmonary  * Hypoxemia  Consider pulmonary embolism workup after ABG obtained tonight.      Cardiac/Vascular  Primary hypertension  Chronic, continue BP meds. Latest blood pressure and vitals reviewed-     Temp:  [97.4 °F (36.3 °C)-97.6 °F (36.4 °C)]   Pulse:  [75-79]   Resp:  [18-20]   BP:  "(135-171)/(69-84)   SpO2:  [95 %-98 %] .   Home meds for hypertension were reviewed and noted below.   Hypertension Medications               bisoprolol (ZEBETA) 5 MG tablet Take 5 mg by mouth.    isosorbide mononitrate (IMDUR) 30 MG 24 hr tablet Take 30 mg by mouth every morning.    losartan (COZAAR) 50 MG tablet Take 50 mg by mouth.            While in the hospital, will manage blood pressure as follows; Continue home antihypertensive regimen    Will utilize p.r.n. blood pressure medication only if patient's blood pressure greater than 180/110 and she develops symptoms such as worsening chest pain or shortness of breath.      Final Active Diagnoses:    Diagnosis Date Noted POA    PRINCIPAL PROBLEM:  Hypoxemia [R09.02] 07/15/2024 Yes    Acquired hypothyroidism [E03.9] 07/16/2024 Yes    Primary hypertension [I10] 07/15/2024 Yes      Problems Resolved During this Admission:    Diagnosis Date Noted Date Resolved POA    Exacerbation of asthma [J45.901] 08/15/2023 07/22/2024 Yes       Discharged Condition: fair    Disposition[place] [facility] [place]    Follow Up:   Follow-up Information       Reji Duckworth III, MD Follow up on 8/7/2024.    Specialty: Internal Medicine  Why: F/U 08/07/2024 @1:45  Contact information:  [address]  [address]  [place] [address]  [phone]               [facility]- Follow up.    Why: They will call you to go and see you to resume care.  Contact information:  [address] [address]  [phone]                           Patient Instructions:      OXYGEN FOR HOME USE     Order Specific Question Answer Comments   Liter Flow 2    Duration Continuous    Qualifying Test Performed at: [facility]    Oxygen saturation: 88    Portable mode: continuous    Route nasal cannula    Device: home concentrator with portable tanks    Length of need (in months): 99 mos    Patient condition with qualifying saturation COPD    Height: 5' 2" (1.575 m)    Weight: 60.8 kg (134 " lb)    Alternative treatment measures have been tried or considered and deemed clinically ineffective. Yes      Ambulatory referral/consult to Home Health   Standing Status: Future   Referral Priority: Routine Referral Type: Home Health   Referral Reason: Specialty Services Required   Requested Specialty: Home Health Services   Number of Visits Requested: 1       Significant Diagnostic Studies: Labs: CMP   Recent Labs   Lab 07/21/24  0806 07/22/24  0429    136   K 3.8 4.2    104   CO2 26 26   BUN 12.0 16.0   CREATININE 0.61 0.61   CALCIUM 8.7 8.4   ALBUMIN 3.0* 2.8*   BILITOT 0.7 0.6   ALKPHOS 38* 39*   AST 25 24   ALT 31 34    and CBC   Recent Labs   Lab 07/21/24  0806 07/22/24  0429   WBC 16.84* 17.00*   HGB 11.2* 11.6*   HCT 33.3* 34.4*    227     Radiology: CT scan: CT ABDOMEN PELVIS WITH CONTRAST: No results found for this visit on 07/15/24.    Pending Diagnostic Studies:       None           Medications:  Reconciled Home Medications:      Medication List        START taking these medications      azithromycin 500 MG tablet  Commonly known as: ZITHROMAX  Take 1 tablet (500 mg total) by mouth once daily. for 3 days  Start taking on: July 23, 2024     guaiFENesin 600 mg 12 hr tablet  Commonly known as: MUCINEX  Take 1 tablet (600 mg total) by mouth 2 (two) times daily.     predniSONE 20 MG tablet  Commonly known as: DELTASONE  Take 1 tablet (20 mg total) by mouth 2 (two) times daily for 2 days, THEN 1 tablet (20 mg total) once daily for 2 days, THEN 0.5 tablets (10 mg total) once daily for 2 days.  Start taking on: July 22, 2024            CHANGE how you take these medications      albuterol-ipratropium 2.5 mg-0.5 mg/3 mL nebulizer solution  Commonly known as: DUO-NEB  Take 3 mLs by nebulization every 4 (four) hours as needed for Wheezing.  What changed: Another medication with the same name was removed. Continue taking this medication, and follow the directions you see here.     losartan 100  MG tablet  Commonly known as: COZAAR  Take 1 tablet (100 mg total) by mouth once daily.  What changed:   medication strength  how much to take  when to take this            CONTINUE taking these medications      albuterol 90 mcg/actuation inhaler  Commonly known as: PROVENTIL/VENTOLIN HFA  Inhale 2 puffs into the lungs every 6 (six) hours as needed for Wheezing. Rescue     benzonatate 100 MG capsule  Commonly known as: TESSALON  Take 200 mg by mouth.     budesonide 0.5 mg/2 mL nebulizer solution  Commonly known as: PULMICORT  Take 2 mLs (0.5 mg total) by nebulization 2 (two) times a day. Controller     isosorbide mononitrate 30 MG 24 hr tablet  Commonly known as: IMDUR  Take 30 mg by mouth every morning.     lovastatin 40 MG tablet  Commonly known as: MEVACOR  Take 40 mg by mouth.     memantine 10 MG Tab  Commonly known as: NAMENDA  Take 5 mg by mouth.     montelukast 10 mg tablet  Commonly known as: SINGULAIR  Take 1 tablet (10 mg total) by mouth every evening.     pantoprazole 40 MG tablet  Commonly known as: PROTONIX  Take 40 mg by mouth once daily.     vitamin D 1000 units Tab  Commonly known as: VITAMIN D3  Take 2,000 Units by mouth.            STOP taking these medications      bisoprolol 5 MG tablet  Commonly known as: ZEBETA     cetirizine 10 MG tablet  Commonly known as: ZYRTEC     dextromethorphan 30 mg/5 mL liquid  Commonly known as: DELSYM     diclofenac 75 MG EC tablet  Commonly known as: VOLTAREN              Indwelling Lines/Drains at time of discharge:   Lines/Drains/Airways       None                   Time spent on the discharge of patient: 40 minutes         Reji Duckworth III, MD  Department of Hospital Medicine  [facility]

## 2024-07-24 NOTE — PROGRESS NOTES
3rd attempt made to reach patient for [facility] call. Left voicemail please call [phone] leave first name, last, and date of birth for Juan Antonio. I will return your call.

## 2024-08-20 ENCOUNTER — INFUSION (OUTPATIENT)
Dept: INFUSION THERAPY | Facility: HOSPITAL | Age: 89
End: 2024-08-20
Attending: INTERNAL MEDICINE
Payer: MEDICARE

## 2024-08-20 VITALS
BODY MASS INDEX: 25.3 KG/M2 | WEIGHT: 134 LBS | RESPIRATION RATE: 20 BRPM | HEART RATE: 102 BPM | DIASTOLIC BLOOD PRESSURE: 72 MMHG | HEIGHT: 61 IN | SYSTOLIC BLOOD PRESSURE: 142 MMHG | OXYGEN SATURATION: 97 % | TEMPERATURE: 98 F

## 2024-08-20 DIAGNOSIS — J45.50 SEVERE PERSISTENT ASTHMA, UNSPECIFIED WHETHER COMPLICATED: Primary | ICD-10-CM

## 2024-08-20 PROCEDURE — 96372 THER/PROPH/DIAG INJ SC/IM: CPT

## 2024-08-20 PROCEDURE — 63600175 PHARM REV CODE 636 W HCPCS: Mod: JZ,JG | Performed by: NURSE PRACTITIONER

## 2024-08-20 RX ADMIN — BENRALIZUMAB 30 MG: 30 INJECTION, SOLUTION SUBCUTANEOUS at 09:08

## 2024-10-17 ENCOUNTER — INFUSION (OUTPATIENT)
Dept: INFUSION THERAPY | Facility: HOSPITAL | Age: 89
End: 2024-10-17
Attending: INTERNAL MEDICINE
Payer: MEDICARE

## 2024-10-17 VITALS
SYSTOLIC BLOOD PRESSURE: 125 MMHG | HEART RATE: 92 BPM | WEIGHT: 128.63 LBS | HEIGHT: 61 IN | OXYGEN SATURATION: 94 % | DIASTOLIC BLOOD PRESSURE: 75 MMHG | TEMPERATURE: 98 F | RESPIRATION RATE: 18 BRPM | BODY MASS INDEX: 24.29 KG/M2

## 2024-10-17 DIAGNOSIS — J45.50 SEVERE PERSISTENT ASTHMA, UNSPECIFIED WHETHER COMPLICATED: Primary | ICD-10-CM

## 2024-10-17 PROCEDURE — 63600175 PHARM REV CODE 636 W HCPCS: Mod: JZ,JG | Performed by: NURSE PRACTITIONER

## 2024-10-17 PROCEDURE — 96372 THER/PROPH/DIAG INJ SC/IM: CPT

## 2024-10-17 RX ADMIN — BENRALIZUMAB 30 MG: 30 INJECTION, SOLUTION SUBCUTANEOUS at 08:10

## 2024-12-11 ENCOUNTER — INFUSION (OUTPATIENT)
Dept: INFUSION THERAPY | Facility: HOSPITAL | Age: 89
End: 2024-12-11
Attending: INTERNAL MEDICINE
Payer: MEDICARE

## 2024-12-11 VITALS
TEMPERATURE: 98 F | HEIGHT: 60 IN | SYSTOLIC BLOOD PRESSURE: 126 MMHG | BODY MASS INDEX: 26.31 KG/M2 | WEIGHT: 134 LBS | DIASTOLIC BLOOD PRESSURE: 76 MMHG | RESPIRATION RATE: 20 BRPM | HEART RATE: 94 BPM | OXYGEN SATURATION: 94 %

## 2024-12-11 DIAGNOSIS — J45.50 SEVERE PERSISTENT ASTHMA, UNSPECIFIED WHETHER COMPLICATED: Primary | ICD-10-CM

## 2024-12-11 PROCEDURE — 96372 THER/PROPH/DIAG INJ SC/IM: CPT

## 2024-12-11 PROCEDURE — 63600175 PHARM REV CODE 636 W HCPCS: Mod: JZ,JG | Performed by: NURSE PRACTITIONER

## 2024-12-11 RX ADMIN — BENRALIZUMAB 30 MG: 30 INJECTION, SOLUTION SUBCUTANEOUS at 12:12

## 2025-01-23 ENCOUNTER — HOSPITAL ENCOUNTER (EMERGENCY)
Facility: HOSPITAL | Age: 89
Discharge: HOME | End: 2025-01-23
Attending: EMERGENCY MEDICINE
Payer: MEDICARE

## 2025-01-23 VITALS
DIASTOLIC BLOOD PRESSURE: 72 MMHG | HEART RATE: 76 BPM | RESPIRATION RATE: 18 BRPM | HEIGHT: 60 IN | OXYGEN SATURATION: 99 % | BODY MASS INDEX: 21.6 KG/M2 | TEMPERATURE: 98 F | WEIGHT: 110 LBS | SYSTOLIC BLOOD PRESSURE: 162 MMHG

## 2025-01-23 DIAGNOSIS — M54.50 CHRONIC BILATERAL LOW BACK PAIN WITHOUT SCIATICA: Primary | ICD-10-CM

## 2025-01-23 DIAGNOSIS — G89.29 CHRONIC BILATERAL LOW BACK PAIN WITHOUT SCIATICA: Primary | ICD-10-CM

## 2025-01-23 DIAGNOSIS — N30.00 ACUTE CYSTITIS WITHOUT HEMATURIA: ICD-10-CM

## 2025-01-23 LAB
ALBUMIN SERPL-MCNC: 3.5 G/DL (ref 3.4–4.8)
ALBUMIN/GLOB SERPL: 1.1 RATIO (ref 1.1–2)
ALP SERPL-CCNC: 53 UNIT/L (ref 40–150)
ALT SERPL-CCNC: 20 UNIT/L (ref 0–55)
ANION GAP SERPL CALC-SCNC: 11 MEQ/L
AST SERPL-CCNC: 27 UNIT/L (ref 5–34)
BACTERIA #/AREA URNS AUTO: ABNORMAL /HPF
BASOPHILS # BLD AUTO: 0.03 X10(3)/MCL
BASOPHILS NFR BLD AUTO: 0.3 %
BILIRUB SERPL-MCNC: 1.2 MG/DL
BILIRUB UR QL STRIP.AUTO: NEGATIVE
BUN SERPL-MCNC: 11 MG/DL (ref 9.8–20.1)
CALCIUM SERPL-MCNC: 9.6 MG/DL (ref 8.4–10.2)
CHLORIDE SERPL-SCNC: 103 MMOL/L (ref 98–107)
CLARITY UR: CLEAR
CO2 SERPL-SCNC: 25 MMOL/L (ref 23–31)
COLOR UR AUTO: YELLOW
CREAT SERPL-MCNC: 0.77 MG/DL (ref 0.55–1.02)
CREAT/UREA NIT SERPL: 14
EOSINOPHIL # BLD AUTO: 0 X10(3)/MCL (ref 0–0.9)
EOSINOPHIL NFR BLD AUTO: 0 %
ERYTHROCYTE [DISTWIDTH] IN BLOOD BY AUTOMATED COUNT: 13.2 % (ref 11.5–17)
GFR SERPLBLD CREATININE-BSD FMLA CKD-EPI: >60 ML/MIN/1.73/M2
GLOBULIN SER-MCNC: 3.3 GM/DL (ref 2.4–3.5)
GLUCOSE SERPL-MCNC: 103 MG/DL (ref 82–115)
GLUCOSE UR QL STRIP: NEGATIVE
HCT VFR BLD AUTO: 37.6 % (ref 37–47)
HGB BLD-MCNC: 12.5 G/DL (ref 12–16)
HGB UR QL STRIP: ABNORMAL
IMM GRANULOCYTES # BLD AUTO: 0.06 X10(3)/MCL (ref 0–0.04)
IMM GRANULOCYTES NFR BLD AUTO: 0.6 %
KETONES UR QL STRIP: NEGATIVE
LEUKOCYTE ESTERASE UR QL STRIP: ABNORMAL
LYMPHOCYTES # BLD AUTO: 1.59 X10(3)/MCL (ref 0.6–4.6)
LYMPHOCYTES NFR BLD AUTO: 15.9 %
MCH RBC QN AUTO: 30.9 PG (ref 27–31)
MCHC RBC AUTO-ENTMCNC: 33.2 G/DL (ref 33–36)
MCV RBC AUTO: 93.1 FL (ref 80–94)
MONOCYTES # BLD AUTO: 0.52 X10(3)/MCL (ref 0.1–1.3)
MONOCYTES NFR BLD AUTO: 5.2 %
NEUTROPHILS # BLD AUTO: 7.83 X10(3)/MCL (ref 2.1–9.2)
NEUTROPHILS NFR BLD AUTO: 78 %
NITRITE UR QL STRIP: POSITIVE
NRBC BLD AUTO-RTO: 0 %
PH UR STRIP: 7.5 [PH]
PLATELET # BLD AUTO: 167 X10(3)/MCL (ref 130–400)
PMV BLD AUTO: 10.2 FL (ref 7.4–10.4)
POTASSIUM SERPL-SCNC: 4.2 MMOL/L (ref 3.5–5.1)
PROT SERPL-MCNC: 6.8 GM/DL (ref 5.8–7.6)
PROT UR QL STRIP: NEGATIVE
RBC # BLD AUTO: 4.04 X10(6)/MCL (ref 4.2–5.4)
RBC #/AREA URNS AUTO: ABNORMAL /HPF
SODIUM SERPL-SCNC: 139 MMOL/L (ref 136–145)
SP GR UR STRIP.AUTO: 1.01 (ref 1–1.03)
SQUAMOUS #/AREA URNS AUTO: ABNORMAL /HPF
UROBILINOGEN UR STRIP-ACNC: 1
WBC # BLD AUTO: 10.03 X10(3)/MCL (ref 4.5–11.5)
WBC #/AREA URNS AUTO: ABNORMAL /HPF

## 2025-01-23 PROCEDURE — 80053 COMPREHEN METABOLIC PANEL: CPT | Performed by: EMERGENCY MEDICINE

## 2025-01-23 PROCEDURE — 85025 COMPLETE CBC W/AUTO DIFF WBC: CPT | Performed by: EMERGENCY MEDICINE

## 2025-01-23 PROCEDURE — 25000003 PHARM REV CODE 250: Performed by: EMERGENCY MEDICINE

## 2025-01-23 PROCEDURE — 81003 URINALYSIS AUTO W/O SCOPE: CPT | Performed by: EMERGENCY MEDICINE

## 2025-01-23 PROCEDURE — 99284 EMERGENCY DEPT VISIT MOD MDM: CPT | Mod: 25

## 2025-01-23 RX ORDER — HYDROCODONE BITARTRATE AND ACETAMINOPHEN 5; 325 MG/1; MG/1
1 TABLET ORAL EVERY 6 HOURS PRN
Qty: 12 TABLET | Refills: 0 | Status: SHIPPED | OUTPATIENT
Start: 2025-01-23 | End: 2025-01-23

## 2025-01-23 RX ORDER — HYDROCODONE BITARTRATE AND ACETAMINOPHEN 5; 325 MG/1; MG/1
1 TABLET ORAL
Status: COMPLETED | OUTPATIENT
Start: 2025-01-23 | End: 2025-01-23

## 2025-01-23 RX ORDER — CEFDINIR 300 MG/1
300 CAPSULE ORAL 2 TIMES DAILY
Qty: 14 CAPSULE | Refills: 0 | Status: SHIPPED | OUTPATIENT
Start: 2025-01-23 | End: 2025-01-23

## 2025-01-23 RX ORDER — CEFDINIR 300 MG/1
300 CAPSULE ORAL 2 TIMES DAILY
Qty: 14 CAPSULE | Refills: 0 | Status: SHIPPED | OUTPATIENT
Start: 2025-01-23 | End: 2025-01-30

## 2025-01-23 RX ORDER — HYDROCODONE BITARTRATE AND ACETAMINOPHEN 5; 325 MG/1; MG/1
1 TABLET ORAL EVERY 6 HOURS PRN
Qty: 12 TABLET | Refills: 0 | Status: SHIPPED | OUTPATIENT
Start: 2025-01-23

## 2025-01-23 RX ADMIN — HYDROCODONE BITARTRATE AND ACETAMINOPHEN 1 TABLET: 5; 325 TABLET ORAL at 10:01

## 2025-01-23 NOTE — ED PROVIDER NOTES
Encounter Date: 1/23/2025       History     Chief Complaint   Patient presents with    Back Pain     Brought per [facility] from home for acute on chronic low right side back pain     HPI  89-year-old female history of COPD, hypertension, chronic back pain now with complaints of 1 day history of increasing lower back pain right-greater-than-left with no history of trauma.  Patient also denies associated focal motor or sensory changes, saddle anesthesia, incontinence, dysuria, hematuria, chest pain, shortness of breath or abdominal pain.  Patient was given Tylenol for her pain 6 hours ago by family with some relief.  Patient states the pain is mild-to-moderate in nature, increases with movement relieved by rest.  Review of patient's allergies indicates:   Allergen Reactions    Ciprofloxacin Itching     Past Medical History:   Diagnosis Date    COPD (chronic obstructive pulmonary disease)     Hypertension     Thyroid disease      Past Surgical History:   Procedure Laterality Date    APPENDECTOMY      CHOLECYSTECTOMY      HYSTERECTOMY      SPINE SURGERY      TONSILLECTOMY       Family History   Problem Relation Name Age of Onset    Brain cancer Mother      Heart disease Father       Social History     Tobacco Use    Smoking status: Never    Smokeless tobacco: Never   Substance Use Topics    Alcohol use: Not Currently    Drug use: Never     Review of Systems   All other systems reviewed and are negative.      Physical Exam     Initial Vitals [01/23/25 0934]   BP Pulse Resp Temp SpO2   (!) 174/80 83 16 98.4 °F (36.9 °C) 98 %      MAP       --         Physical Exam    Nursing note and vitals reviewed.  Constitutional: She appears well-developed and well-nourished. She is cooperative.   HENT:   Head: Normocephalic and atraumatic.   Eyes: Conjunctivae and lids are normal.   Neck: Trachea normal. Neck supple.   Normal range of motion.  Cardiovascular:  Normal rate.           Abdominal: Abdomen is soft. Bowel sounds are normal.  There is no abdominal tenderness.   Musculoskeletal:      Cervical back: Normal range of motion and neck supple.      Comments: Decreased range of motion secondary to pain, tender to palpation L2-L4 right-greater-than-left paraspinal with no midline pain or vertebral step-offs, negative straight leg test, negative crossover     Neurological: She is alert and oriented to person, place, and time. She has normal strength. She displays normal reflexes. No cranial nerve deficit or sensory deficit. GCS score is 15. GCS eye subscore is 4. GCS verbal subscore is 5. GCS motor subscore is 6.   Skin: Skin is warm and dry. No rash noted.   Psychiatric: She has a normal mood and affect. Her speech is normal and behavior is normal. Judgment and thought content normal.         ED Course   Procedures  Labs Reviewed   URINALYSIS - Abnormal       Result Value    Color, UA Yellow      Appearance, UA Clear      Specific Gravity, UA 1.015      pH, UA 7.5      Protein, UA Negative      Glucose, UA Negative      Ketones, UA Negative      Blood, UA Trace-Intact (*)     Bilirubin, UA Negative      Urobilinogen, UA 1.0      Nitrites, UA Positive (*)     Leukocyte Esterase, UA 1+ (*)    CBC WITH DIFFERENTIAL - Abnormal    WBC 10.03      RBC 4.04 (*)     Hgb 12.5      Hct 37.6      MCV 93.1      MCH 30.9      MCHC 33.2      RDW 13.2      Platelet 167      MPV 10.2      Neut % 78.0      Lymph % 15.9      Mono % 5.2      Eos % 0.0      Basophil % 0.3      Imm Grans % 0.6      Neut # 7.83      Lymph # 1.59      Mono # 0.52      Eos # 0.00      Baso # 0.03      Imm Gran # 0.06 (*)     NRBC% 0.0     URINALYSIS, MICROSCOPIC - Abnormal    Bacteria, UA Many (*)     RBC, UA 0-2      WBC, UA 6-10 (*)     Squamous Epithelial Cells, UA Few (*)    CBC W/ AUTO DIFFERENTIAL    Narrative:     The following orders were created for panel order CBC auto differential.  Procedure                               Abnormality         Status                      ---------                               -----------         ------                     CBC with Differential[[id number]]       Abnormal            Final result                 Please view results for these tests on the individual orders.   COMPREHENSIVE METABOLIC PANEL    Sodium 139      Potassium 4.2      Chloride 103      CO2 25      Glucose 103      Blood Urea Nitrogen 11.0      Creatinine 0.77      Calcium 9.6      Protein Total 6.8      Albumin 3.5      Globulin 3.3      Albumin/Globulin Ratio 1.1      Bilirubin Total 1.2      ALP 53      ALT 20      AST 27      eGFR >60      Anion Gap 11.0      BUN/Creatinine Ratio 14            Imaging Results              X-Ray Lumbar Spine Ap And Lateral (Final result)  Result time 01/23/25 10:36:29      Final result by Reji Oliveira MD (01/23/25 10:36:29)                   Impression:      Stable superior endplate compression fracture deformity of the L4 vertebral body, similar to prior study.  No evidence of an acute fracture.      Electronically signed by: Reji Oliveira  Date:    01/23/2025  Time:    10:36               Narrative:    EXAMINATION:  XR LUMBAR SPINE AP AND LATERAL    CLINICAL HISTORY:  back pain-no injury;    TECHNIQUE:  AP and lateral views of the lumbar spine were performed.    COMPARISON:  L-spine radiographs 09/19/2022    FINDINGS:  Postsurgical changes T12 and L3 vertebroplasty.  There is a superior endplate compression fracture deformity of the L4 vertebral body which appears stable compared to prior study.  The remaining vertebral body heights appear well maintained.  There is moderate multilevel spondylosis of the lumbar spine with marginal endplate osteophytes and facet arthropathy.  There is moderate disc height loss of L5-S1 and mild disc height loss of L2-L3, L3-L4, and L4-L5.  Atherosclerotic calcifications of the abdominal aorta are noted.  Cholecystectomy clips are noted.                                       Medications    HYDROcodone-acetaminophen 5-325 mg per tablet 1 tablet (1 tablet Oral Given 1/23/25 1015)     Medical Decision Making  Amount and/or Complexity of Data Reviewed  Labs: ordered.  Radiology: ordered.    Risk  Prescription drug management.    81-year-old female history of chronic low back pain with previous compression fractures now with complaints of increasing pain to her back x1 day with no history of any new trauma.  Patient has no motor or sensory changes, no incontinence, no saddle anesthesia.  LS spine shows a stable superior endplate compression fracture at L4 which has been noted previously.  Labs essentially WNL.  UA with evidence of infection with 6-10 WBCs and positive nitrites.  Will discharge home with cefdinir Rx, Tylenol for mild-to-moderate pain, limited Rx for Norco for severe pain, warm compresses to affected area, Lidoderm patches to affected area, close follow up with PCP in 1-2 days for recheck, return for worsening symptoms or any other concerns.                                  Clinical Impression:  Final diagnoses:  [M54.50, G89.29] Chronic bilateral low back pain without sciatica - Acute Exacerbation (Primary)  [N30.00] Acute cystitis without hematuria          ED Disposition Condition    Discharge Stable          ED Prescriptions       Medication Sig Dispense Start Date End Date Auth. Provider    cefdinir (OMNICEF) 300 MG capsule  (Status: Discontinued) Take 1 capsule (300 mg total) by mouth 2 (two) times daily. for 7 days 14 capsule 1/23/2025 1/23/2025 Jus Mcbride MD    HYDROcodone-acetaminophen (NORCO) 5-325 mg per tablet  (Status: Discontinued) Take 1 tablet by mouth every 6 (six) hours as needed for Pain. 12 tablet 1/23/2025 1/23/2025 Jus Mcbride MD    cefdinir (OMNICEF) 300 MG capsule Take 1 capsule (300 mg total) by mouth 2 (two) times daily. for 7 days 14 capsule 1/23/2025 1/30/2025 Jus Mcbride MD    HYDROcodone-acetaminophen (NORCO) 5-325 mg per tablet  Take 1 tablet by mouth every 6 (six) hours as needed for Pain. 12 tablet 1/23/2025 -- Jus Mcbride MD          Follow-up Information       Follow up With Specialties Details Why Contact Info    Reji Duckworth III, MD Internal Medicine Schedule an appointment as soon as possible for a visit in 2 days  [address]  [address]  [place] [address]  [phone]               Jus Mcbride MD  01/24/25 0702

## 2025-02-04 ENCOUNTER — INFUSION (OUTPATIENT)
Dept: INFUSION THERAPY | Facility: HOSPITAL | Age: 89
End: 2025-02-04
Attending: INTERNAL MEDICINE
Payer: MEDICARE

## 2025-02-04 VITALS
HEIGHT: 60 IN | SYSTOLIC BLOOD PRESSURE: 105 MMHG | RESPIRATION RATE: 18 BRPM | DIASTOLIC BLOOD PRESSURE: 66 MMHG | BODY MASS INDEX: 25.13 KG/M2 | OXYGEN SATURATION: 96 % | WEIGHT: 128 LBS | HEART RATE: 80 BPM | TEMPERATURE: 98 F

## 2025-02-04 DIAGNOSIS — J45.50 SEVERE PERSISTENT ASTHMA, UNSPECIFIED WHETHER COMPLICATED: Primary | ICD-10-CM

## 2025-02-04 PROCEDURE — 96372 THER/PROPH/DIAG INJ SC/IM: CPT

## 2025-02-04 PROCEDURE — 63600175 PHARM REV CODE 636 W HCPCS: Mod: JZ,TB | Performed by: NURSE PRACTITIONER

## 2025-02-04 RX ADMIN — BENRALIZUMAB 30 MG: 30 INJECTION, SOLUTION SUBCUTANEOUS at 09:02

## 2025-04-01 ENCOUNTER — INFUSION (OUTPATIENT)
Facility: HOSPITAL | Age: 89
End: 2025-04-01
Attending: INTERNAL MEDICINE
Payer: MEDICARE

## 2025-04-01 VITALS
DIASTOLIC BLOOD PRESSURE: 58 MMHG | RESPIRATION RATE: 16 BRPM | OXYGEN SATURATION: 96 % | WEIGHT: 129.19 LBS | HEART RATE: 83 BPM | TEMPERATURE: 98 F | BODY MASS INDEX: 25.36 KG/M2 | SYSTOLIC BLOOD PRESSURE: 129 MMHG | HEIGHT: 60 IN

## 2025-04-01 DIAGNOSIS — J45.50 SEVERE PERSISTENT ASTHMA, UNSPECIFIED WHETHER COMPLICATED: Primary | ICD-10-CM

## 2025-04-01 PROCEDURE — 96372 THER/PROPH/DIAG INJ SC/IM: CPT

## 2025-04-01 PROCEDURE — 63600175 PHARM REV CODE 636 W HCPCS: Mod: JZ,TB | Performed by: NURSE PRACTITIONER

## 2025-04-01 RX ADMIN — BENRALIZUMAB 30 MG: 30 INJECTION, SOLUTION SUBCUTANEOUS at 01:04

## 2025-05-06 ENCOUNTER — HOSPITAL ENCOUNTER (OUTPATIENT)
Dept: RADIOLOGY | Facility: HOSPITAL | Age: 89
Discharge: HOME | End: 2025-05-06
Attending: FAMILY MEDICINE
Payer: MEDICARE

## 2025-05-06 DIAGNOSIS — R05.1 ACUTE COUGH: ICD-10-CM

## 2025-05-06 PROCEDURE — 71046 X-RAY EXAM CHEST 2 VIEWS: CPT | Mod: TC

## 2025-07-14 ENCOUNTER — INFUSION (OUTPATIENT)
Facility: HOSPITAL | Age: 89
End: 2025-07-14
Attending: INTERNAL MEDICINE
Payer: MEDICARE

## 2025-07-14 VITALS
RESPIRATION RATE: 20 BRPM | WEIGHT: 120 LBS | HEIGHT: 60 IN | HEART RATE: 82 BPM | SYSTOLIC BLOOD PRESSURE: 125 MMHG | OXYGEN SATURATION: 95 % | BODY MASS INDEX: 23.56 KG/M2 | TEMPERATURE: 98 F | DIASTOLIC BLOOD PRESSURE: 57 MMHG

## 2025-07-14 DIAGNOSIS — J45.50 SEVERE PERSISTENT ASTHMA, UNSPECIFIED WHETHER COMPLICATED: Primary | ICD-10-CM

## 2025-07-14 PROCEDURE — 63600175 PHARM REV CODE 636 W HCPCS: Mod: JZ,TB | Performed by: INTERNAL MEDICINE

## 2025-07-14 PROCEDURE — 96372 THER/PROPH/DIAG INJ SC/IM: CPT

## 2025-07-14 RX ADMIN — BENRALIZUMAB 30 MG: 30 INJECTION, SOLUTION SUBCUTANEOUS at 12:07
